# Patient Record
Sex: FEMALE | Race: WHITE | NOT HISPANIC OR LATINO | Employment: FULL TIME | URBAN - METROPOLITAN AREA
[De-identification: names, ages, dates, MRNs, and addresses within clinical notes are randomized per-mention and may not be internally consistent; named-entity substitution may affect disease eponyms.]

---

## 2018-01-26 LAB
EXTERNAL ABO GROUPING: NORMAL
EXTERNAL ANTIBODY SCREEN: NEGATIVE
EXTERNAL HEPATITIS B SURFACE ANTIGEN: NEGATIVE
EXTERNAL HEPATITIS C AB: NORMAL
EXTERNAL RH FACTOR: NEGATIVE
EXTERNAL RUBELLA QUALITATIVE: NORMAL
EXTERNAL SYPHILIS RPR SCREEN: NORMAL
HIV1 P24 AG SERPL QL IA: NORMAL

## 2018-05-08 ENCOUNTER — TRANSCRIBE ORDERS (OUTPATIENT)
Dept: ADMINISTRATIVE | Facility: HOSPITAL | Age: 38
End: 2018-05-08

## 2018-05-08 DIAGNOSIS — N85.6 UTERINE SYNECHIAE: Primary | ICD-10-CM

## 2018-06-11 ENCOUNTER — HOSPITAL ENCOUNTER (OUTPATIENT)
Dept: ULTRASOUND IMAGING | Facility: HOSPITAL | Age: 38
Discharge: HOME | End: 2018-06-11
Attending: OBSTETRICS & GYNECOLOGY | Admitting: OBSTETRICS & GYNECOLOGY

## 2018-06-11 ENCOUNTER — APPOINTMENT (OUTPATIENT)
Dept: ULTRASOUND IMAGING | Facility: HOSPITAL | Age: 38
End: 2018-06-11
Attending: OBSTETRICS & GYNECOLOGY

## 2018-06-11 DIAGNOSIS — N85.6 UTERINE SYNECHIAE: ICD-10-CM

## 2018-06-11 PROCEDURE — 76805 OB US >/= 14 WKS SNGL FETUS: CPT

## 2018-06-11 NOTE — CONSULTS
"[facility]  Maternal-Fetal Medicine Consult Note    Dear Dr. Cobos:     I saw the above named patient for consultation upon your request due to AMA, succenturiate lobe and possible synechiae.     I was able to view her prenatal record as a faxed copy from your office.     Normal fetal anatomy, posterior succenturiate lobe with vessels running through the \"shelf/fold\" in the chorion/amnion.     A follow up scan at 32-33 weeks is suggested to screen for SGA/IUGR and assess for appropriate interval fetal growth.     If you wish, the follow up imaging and testing can be performed in your office.  Alternatively, we would be happy to perform the evaluation in the [facility].  Please call to schedule if you would prefer.     Due to maternal age, because of the increased risk of unexplained, late pregnancy stillbirth, I recommend initiating weekly BPP by 34-35 weeks along with daily Fetal Movement Monitoring and delivery at 39-40 completed weeks, or earlier if medically or obstetrically indicated.  I recommend early 1 hr GCT along with a TSH, since about 1% of women >35 are hypothyroid.     Cystic Fibrosis and SMA carrier screening is advised by ACOG and ACMG, and I encourage screening through your office.  Alternatively, consider Expanded Carrier Screening.     PRIOR  - studies have not shown that ultrasound can reliably determine the integrity of a previous  section scar.     Printed information was provided to your patient regarding AMA.     ACOG along with the CDC recommend Tdap vaccine after 20 weeks gestation during each pregnancy and a flu vaccine during the flu season to provide passive immunity to the .     For billing purposes, 15 min spent face-to-face with the patient of which > 50% devoted to patient counseling and coordination of care, exclusive of ultrasound exam.     If you have any questions about the results of this sonogram or my recommendations, please feel free to contact me at any " time.     Thank you for referring this patient to the [facility] at [facility].  If you have any questions about the results of this examination or my recommendations, please feel free to contact me at your convenience.     Sincerely yours,    Wood Brunson MD  6/11/2018  1:46 PM

## 2018-08-20 LAB — EXTERNAL GROUP B STREP ANTIGEN: NORMAL

## 2018-09-05 ENCOUNTER — HOSPITAL ENCOUNTER (INPATIENT)
Facility: HOSPITAL | Age: 38
LOS: 3 days | Discharge: HOME | End: 2018-09-08
Attending: OBSTETRICS & GYNECOLOGY | Admitting: OBSTETRICS & GYNECOLOGY

## 2018-09-05 ENCOUNTER — ANESTHESIA (OUTPATIENT)
Dept: LABOR AND DELIVERY | Facility: HOSPITAL | Age: 38
End: 2018-09-05

## 2018-09-05 ENCOUNTER — ANESTHESIA EVENT (OUTPATIENT)
Dept: LABOR AND DELIVERY | Facility: HOSPITAL | Age: 38
End: 2018-09-05

## 2018-09-05 PROBLEM — Z34.90 PREGNANCY: Status: ACTIVE | Noted: 2018-09-05

## 2018-09-05 LAB
ABO GROUP BLD: NORMAL
ABO GROUP BLD: NORMAL
ATMOSPHERIC PRESS: 753.9 MMHG
BASE EXCESS BLDCOV CALC-SCNC: -3.2 MMOL/L (ref -30–30)
BASOPHILS # BLD AUTO: 0.01 10*3/MM3 (ref 0–0.2)
BASOPHILS NFR BLD AUTO: 0.2 % (ref 0–1.5)
BDY SITE: ABNORMAL
BLD GP AB SCN SERPL QL: POSITIVE
DEPRECATED RDW RBC AUTO: 39.1 FL (ref 37–54)
EOSINOPHIL # BLD AUTO: 0.1 10*3/MM3 (ref 0–0.7)
EOSINOPHIL NFR BLD AUTO: 1.5 % (ref 0.3–6.2)
ERYTHROCYTE [DISTWIDTH] IN BLOOD BY AUTOMATED COUNT: 12.2 % (ref 11.7–13)
FETAL BLEED: NEGATIVE
GAS FLOW AIRWAY: 2 LPM
HCO3 BLDCOV-SCNC: 22.7 MMOL/L
HCT VFR BLD AUTO: 32.1 % (ref 35.6–45.5)
HGB BLD-MCNC: 9.9 G/DL (ref 11.9–15.5)
IMM GRANULOCYTES # BLD: 0.02 10*3/MM3 (ref 0–0.03)
IMM GRANULOCYTES NFR BLD: 0.3 % (ref 0–0.5)
LYMPHOCYTES # BLD AUTO: 1.5 10*3/MM3 (ref 0.9–4.8)
LYMPHOCYTES NFR BLD AUTO: 22.5 % (ref 19.6–45.3)
MCH RBC QN AUTO: 26.9 PG (ref 26.9–32)
MCHC RBC AUTO-ENTMCNC: 30.8 G/DL (ref 32.4–36.3)
MCV RBC AUTO: 87.2 FL (ref 80.5–98.2)
MODALITY: ABNORMAL
MONOCYTES # BLD AUTO: 0.39 10*3/MM3 (ref 0.2–1.2)
MONOCYTES NFR BLD AUTO: 5.9 % (ref 5–12)
NEUTROPHILS # BLD AUTO: 4.64 10*3/MM3 (ref 1.9–8.1)
NEUTROPHILS NFR BLD AUTO: 69.6 % (ref 42.7–76)
NUMBER OF DOSES: NORMAL
PCO2 BLDCOV: 42.9 MM HG (ref 35–51.3)
PH BLDCOV: 7.33 PH UNITS (ref 7.26–7.4)
PLATELET # BLD AUTO: 135 10*3/MM3 (ref 140–500)
PMV BLD AUTO: 10 FL (ref 6–12)
PO2 BLDCOV: 25.5 MM HG (ref 19–39)
RBC # BLD AUTO: 3.68 10*6/MM3 (ref 3.9–5.2)
RESIDUAL RHIG DETECTED: NORMAL
RH BLD: NEGATIVE
RH BLD: NEGATIVE
SAO2 % BLDCOA: 42.1 % (ref 92–99)
SAO2 % BLDCOV: ABNORMAL %
T&S EXPIRATION DATE: NORMAL
WBC NRBC COR # BLD: 6.66 10*3/MM3 (ref 4.5–10.7)

## 2018-09-05 PROCEDURE — 25010000002 RHO D IMMUNE GLOBULIN 1500 UNIT/2ML SOLUTION PREFILLED SYRINGE: Performed by: OBSTETRICS & GYNECOLOGY

## 2018-09-05 PROCEDURE — 25010000003 CEFAZOLIN IN DEXTROSE 2-4 GM/100ML-% SOLUTION: Performed by: OBSTETRICS & GYNECOLOGY

## 2018-09-05 PROCEDURE — 86850 RBC ANTIBODY SCREEN: CPT | Performed by: OBSTETRICS & GYNECOLOGY

## 2018-09-05 PROCEDURE — 25010000002 ONDANSETRON PER 1 MG: Performed by: ANESTHESIOLOGY

## 2018-09-05 PROCEDURE — 25010000002 ONDANSETRON PER 1 MG: Performed by: OBSTETRICS & GYNECOLOGY

## 2018-09-05 PROCEDURE — 86901 BLOOD TYPING SEROLOGIC RH(D): CPT | Performed by: OBSTETRICS & GYNECOLOGY

## 2018-09-05 PROCEDURE — 88307 TISSUE EXAM BY PATHOLOGIST: CPT

## 2018-09-05 PROCEDURE — 25010000002 MORPHINE PER 10 MG: Performed by: NURSE ANESTHETIST, CERTIFIED REGISTERED

## 2018-09-05 PROCEDURE — 82803 BLOOD GASES ANY COMBINATION: CPT

## 2018-09-05 PROCEDURE — 85461 HEMOGLOBIN FETAL: CPT | Performed by: OBSTETRICS & GYNECOLOGY

## 2018-09-05 PROCEDURE — 85025 COMPLETE CBC W/AUTO DIFF WBC: CPT | Performed by: OBSTETRICS & GYNECOLOGY

## 2018-09-05 PROCEDURE — 86870 RBC ANTIBODY IDENTIFICATION: CPT | Performed by: OBSTETRICS & GYNECOLOGY

## 2018-09-05 PROCEDURE — 86900 BLOOD TYPING SEROLOGIC ABO: CPT | Performed by: OBSTETRICS & GYNECOLOGY

## 2018-09-05 RX ORDER — NALOXONE HCL 0.4 MG/ML
0.4 VIAL (ML) INJECTION
Status: DISCONTINUED | OUTPATIENT
Start: 2018-09-05 | End: 2018-09-08 | Stop reason: HOSPADM

## 2018-09-05 RX ORDER — LIDOCAINE HYDROCHLORIDE 10 MG/ML
5 INJECTION, SOLUTION EPIDURAL; INFILTRATION; INTRACAUDAL; PERINEURAL AS NEEDED
Status: DISCONTINUED | OUTPATIENT
Start: 2018-09-05 | End: 2018-09-05 | Stop reason: HOSPADM

## 2018-09-05 RX ORDER — FAMOTIDINE 10 MG/ML
20 INJECTION, SOLUTION INTRAVENOUS ONCE AS NEEDED
Status: COMPLETED | OUTPATIENT
Start: 2018-09-05 | End: 2018-09-05

## 2018-09-05 RX ORDER — HYDROXYZINE 50 MG/1
50 TABLET, FILM COATED ORAL EVERY 6 HOURS PRN
Status: DISCONTINUED | OUTPATIENT
Start: 2018-09-05 | End: 2018-09-08 | Stop reason: HOSPADM

## 2018-09-05 RX ORDER — DIPHENHYDRAMINE HYDROCHLORIDE 50 MG/ML
25 INJECTION INTRAMUSCULAR; INTRAVENOUS EVERY 4 HOURS PRN
Status: DISCONTINUED | OUTPATIENT
Start: 2018-09-05 | End: 2018-09-05

## 2018-09-05 RX ORDER — ACETAMINOPHEN 650 MG/1
650 SUPPOSITORY RECTAL EVERY 4 HOURS PRN
Status: DISCONTINUED | OUTPATIENT
Start: 2018-09-05 | End: 2018-09-05 | Stop reason: HOSPADM

## 2018-09-05 RX ORDER — ONDANSETRON 2 MG/ML
4 INJECTION INTRAMUSCULAR; INTRAVENOUS ONCE AS NEEDED
Status: DISCONTINUED | OUTPATIENT
Start: 2018-09-05 | End: 2018-09-05

## 2018-09-05 RX ORDER — MORPHINE SULFATE 2 MG/ML
2 INJECTION, SOLUTION INTRAMUSCULAR; INTRAVENOUS
Status: DISCONTINUED | OUTPATIENT
Start: 2018-09-05 | End: 2018-09-05

## 2018-09-05 RX ORDER — METHYLERGONOVINE MALEATE 0.2 MG/ML
200 INJECTION INTRAVENOUS AS NEEDED
Status: DISCONTINUED | OUTPATIENT
Start: 2018-09-05 | End: 2018-09-05 | Stop reason: HOSPADM

## 2018-09-05 RX ORDER — ACETAMINOPHEN 500 MG
1000 TABLET ORAL EVERY 6 HOURS PRN
COMMUNITY
End: 2018-09-08 | Stop reason: HOSPADM

## 2018-09-05 RX ORDER — ERYTHROMYCIN 5 MG/G
OINTMENT OPHTHALMIC
Status: DISPENSED
Start: 2018-09-05 | End: 2018-09-05

## 2018-09-05 RX ORDER — IBUPROFEN 600 MG/1
600 TABLET ORAL EVERY 6 HOURS PRN
Status: DISCONTINUED | OUTPATIENT
Start: 2018-09-05 | End: 2018-09-05 | Stop reason: HOSPADM

## 2018-09-05 RX ORDER — PROMETHAZINE HYDROCHLORIDE 25 MG/ML
12.5 INJECTION, SOLUTION INTRAMUSCULAR; INTRAVENOUS EVERY 6 HOURS PRN
Status: DISCONTINUED | OUTPATIENT
Start: 2018-09-05 | End: 2018-09-08 | Stop reason: HOSPADM

## 2018-09-05 RX ORDER — SIMETHICONE 80 MG
80 TABLET,CHEWABLE ORAL 4 TIMES DAILY PRN
Status: DISCONTINUED | OUTPATIENT
Start: 2018-09-05 | End: 2018-09-08 | Stop reason: HOSPADM

## 2018-09-05 RX ORDER — MORPHINE SULFATE 1 MG/ML
INJECTION, SOLUTION EPIDURAL; INTRATHECAL; INTRAVENOUS
Status: COMPLETED
Start: 2018-09-05 | End: 2018-09-05

## 2018-09-05 RX ORDER — ACETAMINOPHEN 500 MG
1000 TABLET ORAL EVERY 4 HOURS PRN
Status: DISCONTINUED | OUTPATIENT
Start: 2018-09-05 | End: 2018-09-05 | Stop reason: HOSPADM

## 2018-09-05 RX ORDER — ONDANSETRON 4 MG/1
4 TABLET, ORALLY DISINTEGRATING ORAL EVERY 6 HOURS PRN
Status: DISCONTINUED | OUTPATIENT
Start: 2018-09-05 | End: 2018-09-08 | Stop reason: HOSPADM

## 2018-09-05 RX ORDER — OXYCODONE HYDROCHLORIDE AND ACETAMINOPHEN 5; 325 MG/1; MG/1
2 TABLET ORAL EVERY 4 HOURS PRN
Status: DISCONTINUED | OUTPATIENT
Start: 2018-09-05 | End: 2018-09-08 | Stop reason: HOSPADM

## 2018-09-05 RX ORDER — BUPIVACAINE HYDROCHLORIDE 7.5 MG/ML
INJECTION, SOLUTION INTRASPINAL
Status: DISPENSED
Start: 2018-09-05 | End: 2018-09-05

## 2018-09-05 RX ORDER — ACETAMINOPHEN 500 MG
1000 TABLET ORAL ONCE
Status: COMPLETED | OUTPATIENT
Start: 2018-09-05 | End: 2018-09-05

## 2018-09-05 RX ORDER — OXYTOCIN-SODIUM CHLORIDE 0.9% IV SOLN 30 UNIT/500ML 30-0.9/5 UT/ML-%
125 SOLUTION INTRAVENOUS CONTINUOUS PRN
Status: DISCONTINUED | OUTPATIENT
Start: 2018-09-05 | End: 2018-09-05 | Stop reason: HOSPADM

## 2018-09-05 RX ORDER — DOCUSATE SODIUM 100 MG/1
100 CAPSULE, LIQUID FILLED ORAL 2 TIMES DAILY PRN
Status: DISCONTINUED | OUTPATIENT
Start: 2018-09-05 | End: 2018-09-08 | Stop reason: HOSPADM

## 2018-09-05 RX ORDER — NALBUPHINE HCL 10 MG/ML
10 AMPUL (ML) INJECTION
Status: DISCONTINUED | OUTPATIENT
Start: 2018-09-05 | End: 2018-09-05 | Stop reason: HOSPADM

## 2018-09-05 RX ORDER — PROMETHAZINE HYDROCHLORIDE 25 MG/1
12.5 SUPPOSITORY RECTAL EVERY 6 HOURS PRN
Status: DISCONTINUED | OUTPATIENT
Start: 2018-09-05 | End: 2018-09-08 | Stop reason: HOSPADM

## 2018-09-05 RX ORDER — CEFAZOLIN SODIUM 2 G/100ML
2 INJECTION, SOLUTION INTRAVENOUS ONCE
Status: COMPLETED | OUTPATIENT
Start: 2018-09-05 | End: 2018-09-05

## 2018-09-05 RX ORDER — PRENATAL VIT/IRON FUM/FOLIC AC 27MG-0.8MG
1 TABLET ORAL DAILY
COMMUNITY

## 2018-09-05 RX ORDER — DIPHENHYDRAMINE HCL 25 MG
25 CAPSULE ORAL EVERY 4 HOURS PRN
Status: DISCONTINUED | OUTPATIENT
Start: 2018-09-05 | End: 2018-09-05

## 2018-09-05 RX ORDER — ONDANSETRON 2 MG/ML
4 INJECTION INTRAMUSCULAR; INTRAVENOUS ONCE AS NEEDED
Status: COMPLETED | OUTPATIENT
Start: 2018-09-05 | End: 2018-09-05

## 2018-09-05 RX ORDER — MISOPROSTOL 200 UG/1
600 TABLET ORAL ONCE AS NEEDED
Status: DISCONTINUED | OUTPATIENT
Start: 2018-09-05 | End: 2018-09-08 | Stop reason: HOSPADM

## 2018-09-05 RX ORDER — MORPHINE SULFATE 2 MG/ML
2 INJECTION, SOLUTION INTRAMUSCULAR; INTRAVENOUS EVERY 4 HOURS PRN
Status: DISCONTINUED | OUTPATIENT
Start: 2018-09-05 | End: 2018-09-08 | Stop reason: HOSPADM

## 2018-09-05 RX ORDER — DIPHENHYDRAMINE HYDROCHLORIDE 50 MG/ML
25 INJECTION INTRAMUSCULAR; INTRAVENOUS NIGHTLY PRN
Status: DISCONTINUED | OUTPATIENT
Start: 2018-09-05 | End: 2018-09-05 | Stop reason: HOSPADM

## 2018-09-05 RX ORDER — BUPIVACAINE HYDROCHLORIDE 7.5 MG/ML
INJECTION, SOLUTION EPIDURAL; RETROBULBAR AS NEEDED
Status: DISCONTINUED | OUTPATIENT
Start: 2018-09-05 | End: 2018-09-05 | Stop reason: SURG

## 2018-09-05 RX ORDER — LANOLIN 100 %
OINTMENT (GRAM) TOPICAL
Status: DISCONTINUED | OUTPATIENT
Start: 2018-09-05 | End: 2018-09-08 | Stop reason: HOSPADM

## 2018-09-05 RX ORDER — OXYCODONE HYDROCHLORIDE AND ACETAMINOPHEN 5; 325 MG/1; MG/1
1 TABLET ORAL EVERY 4 HOURS PRN
Status: DISCONTINUED | OUTPATIENT
Start: 2018-09-05 | End: 2018-09-05 | Stop reason: HOSPADM

## 2018-09-05 RX ORDER — ONDANSETRON 4 MG/1
4 TABLET, ORALLY DISINTEGRATING ORAL EVERY 6 HOURS PRN
Status: DISCONTINUED | OUTPATIENT
Start: 2018-09-05 | End: 2018-09-05 | Stop reason: HOSPADM

## 2018-09-05 RX ORDER — EPHEDRINE SULFATE 50 MG/ML
INJECTION, SOLUTION INTRAVENOUS AS NEEDED
Status: DISCONTINUED | OUTPATIENT
Start: 2018-09-05 | End: 2018-09-05 | Stop reason: SURG

## 2018-09-05 RX ORDER — ACETAMINOPHEN 325 MG/1
650 TABLET ORAL EVERY 4 HOURS PRN
Status: DISCONTINUED | OUTPATIENT
Start: 2018-09-05 | End: 2018-09-08 | Stop reason: HOSPADM

## 2018-09-05 RX ORDER — LANOLIN ALCOHOL/MO/W.PET/CERES
400 CREAM (GRAM) TOPICAL DAILY
COMMUNITY
End: 2018-09-08 | Stop reason: HOSPADM

## 2018-09-05 RX ORDER — ONDANSETRON 2 MG/ML
4 INJECTION INTRAMUSCULAR; INTRAVENOUS EVERY 6 HOURS PRN
Status: DISCONTINUED | OUTPATIENT
Start: 2018-09-05 | End: 2018-09-08 | Stop reason: HOSPADM

## 2018-09-05 RX ORDER — SODIUM CHLORIDE 0.9 % (FLUSH) 0.9 %
1-10 SYRINGE (ML) INJECTION AS NEEDED
Status: DISCONTINUED | OUTPATIENT
Start: 2018-09-05 | End: 2018-09-05 | Stop reason: HOSPADM

## 2018-09-05 RX ORDER — DIPHENHYDRAMINE HCL 25 MG
25 CAPSULE ORAL EVERY 4 HOURS PRN
Status: DISCONTINUED | OUTPATIENT
Start: 2018-09-05 | End: 2018-09-08 | Stop reason: HOSPADM

## 2018-09-05 RX ORDER — BISACODYL 10 MG
10 SUPPOSITORY, RECTAL RECTAL DAILY PRN
Status: DISCONTINUED | OUTPATIENT
Start: 2018-09-05 | End: 2018-09-08 | Stop reason: HOSPADM

## 2018-09-05 RX ORDER — SODIUM CHLORIDE 0.9 % (FLUSH) 0.9 %
1-10 SYRINGE (ML) INJECTION AS NEEDED
Status: DISCONTINUED | OUTPATIENT
Start: 2018-09-05 | End: 2018-09-08 | Stop reason: HOSPADM

## 2018-09-05 RX ORDER — ONDANSETRON 4 MG/1
4 TABLET, FILM COATED ORAL EVERY 6 HOURS PRN
Status: DISCONTINUED | OUTPATIENT
Start: 2018-09-05 | End: 2018-09-05 | Stop reason: HOSPADM

## 2018-09-05 RX ORDER — IBUPROFEN 800 MG/1
800 TABLET ORAL EVERY 8 HOURS PRN
Status: DISCONTINUED | OUTPATIENT
Start: 2018-09-05 | End: 2018-09-08 | Stop reason: HOSPADM

## 2018-09-05 RX ORDER — ONDANSETRON 2 MG/ML
4 INJECTION INTRAMUSCULAR; INTRAVENOUS EVERY 6 HOURS PRN
Status: DISCONTINUED | OUTPATIENT
Start: 2018-09-05 | End: 2018-09-05 | Stop reason: HOSPADM

## 2018-09-05 RX ORDER — PROMETHAZINE HYDROCHLORIDE 25 MG/1
25 TABLET ORAL EVERY 6 HOURS PRN
Status: DISCONTINUED | OUTPATIENT
Start: 2018-09-05 | End: 2018-09-08 | Stop reason: HOSPADM

## 2018-09-05 RX ORDER — ONDANSETRON 4 MG/1
4 TABLET, FILM COATED ORAL EVERY 6 HOURS PRN
Status: DISCONTINUED | OUTPATIENT
Start: 2018-09-05 | End: 2018-09-08 | Stop reason: HOSPADM

## 2018-09-05 RX ORDER — PROMETHAZINE HYDROCHLORIDE 25 MG/ML
12.5 INJECTION, SOLUTION INTRAMUSCULAR; INTRAVENOUS EVERY 6 HOURS PRN
Status: DISCONTINUED | OUTPATIENT
Start: 2018-09-05 | End: 2018-09-05 | Stop reason: HOSPADM

## 2018-09-05 RX ORDER — CARBOPROST TROMETHAMINE 250 UG/ML
250 INJECTION, SOLUTION INTRAMUSCULAR AS NEEDED
Status: DISCONTINUED | OUTPATIENT
Start: 2018-09-05 | End: 2018-09-05 | Stop reason: HOSPADM

## 2018-09-05 RX ORDER — PHYTONADIONE 2 MG/ML
INJECTION, EMULSION INTRAMUSCULAR; INTRAVENOUS; SUBCUTANEOUS
Status: DISPENSED
Start: 2018-09-05 | End: 2018-09-05

## 2018-09-05 RX ORDER — NALOXONE HCL 0.4 MG/ML
0.2 VIAL (ML) INJECTION
Status: DISCONTINUED | OUTPATIENT
Start: 2018-09-05 | End: 2018-09-08 | Stop reason: HOSPADM

## 2018-09-05 RX ORDER — OXYTOCIN-SODIUM CHLORIDE 0.9% IV SOLN 30 UNIT/500ML 30-0.9/5 UT/ML-%
250 SOLUTION INTRAVENOUS CONTINUOUS
Status: DISCONTINUED | OUTPATIENT
Start: 2018-09-05 | End: 2018-09-05

## 2018-09-05 RX ORDER — PROMETHAZINE HYDROCHLORIDE 12.5 MG/1
12.5 SUPPOSITORY RECTAL EVERY 6 HOURS PRN
Status: DISCONTINUED | OUTPATIENT
Start: 2018-09-05 | End: 2018-09-05 | Stop reason: HOSPADM

## 2018-09-05 RX ORDER — MISOPROSTOL 200 UG/1
800 TABLET ORAL AS NEEDED
Status: DISCONTINUED | OUTPATIENT
Start: 2018-09-05 | End: 2018-09-05 | Stop reason: HOSPADM

## 2018-09-05 RX ORDER — FENTANYL CITRATE 50 UG/ML
INJECTION, SOLUTION INTRAMUSCULAR; INTRAVENOUS
Status: DISCONTINUED
Start: 2018-09-05 | End: 2018-09-05 | Stop reason: WASHOUT

## 2018-09-05 RX ORDER — ASPIRIN 81 MG/1
81 TABLET, CHEWABLE ORAL DAILY
COMMUNITY
End: 2018-09-08 | Stop reason: HOSPADM

## 2018-09-05 RX ORDER — MAGNESIUM HYDROXIDE/ALUMINUM HYDROXICE/SIMETHICONE 120; 1200; 1200 MG/30ML; MG/30ML; MG/30ML
10 SUSPENSION ORAL EVERY 6 HOURS PRN
COMMUNITY
End: 2018-09-08 | Stop reason: HOSPADM

## 2018-09-05 RX ORDER — OXYTOCIN-SODIUM CHLORIDE 0.9% IV SOLN 30 UNIT/500ML 30-0.9/5 UT/ML-%
999 SOLUTION INTRAVENOUS ONCE
Status: DISCONTINUED | OUTPATIENT
Start: 2018-09-05 | End: 2018-09-05 | Stop reason: HOSPADM

## 2018-09-05 RX ORDER — OXYCODONE HYDROCHLORIDE AND ACETAMINOPHEN 5; 325 MG/1; MG/1
1 TABLET ORAL EVERY 4 HOURS PRN
Status: DISCONTINUED | OUTPATIENT
Start: 2018-09-05 | End: 2018-09-08 | Stop reason: HOSPADM

## 2018-09-05 RX ORDER — SODIUM CHLORIDE, SODIUM LACTATE, POTASSIUM CHLORIDE, CALCIUM CHLORIDE 600; 310; 30; 20 MG/100ML; MG/100ML; MG/100ML; MG/100ML
125 INJECTION, SOLUTION INTRAVENOUS CONTINUOUS
Status: DISCONTINUED | OUTPATIENT
Start: 2018-09-05 | End: 2018-09-05

## 2018-09-05 RX ORDER — MORPHINE SULFATE 1 MG/ML
INJECTION, SOLUTION EPIDURAL; INTRATHECAL; INTRAVENOUS AS NEEDED
Status: DISCONTINUED | OUTPATIENT
Start: 2018-09-05 | End: 2018-09-05 | Stop reason: SURG

## 2018-09-05 RX ORDER — DIPHENHYDRAMINE HCL 25 MG
25 CAPSULE ORAL NIGHTLY PRN
Status: DISCONTINUED | OUTPATIENT
Start: 2018-09-05 | End: 2018-09-05 | Stop reason: HOSPADM

## 2018-09-05 RX ORDER — PROMETHAZINE HYDROCHLORIDE 25 MG/1
12.5 TABLET ORAL EVERY 6 HOURS PRN
Status: DISCONTINUED | OUTPATIENT
Start: 2018-09-05 | End: 2018-09-05 | Stop reason: HOSPADM

## 2018-09-05 RX ADMIN — ONDANSETRON 4 MG: 2 INJECTION INTRAMUSCULAR; INTRAVENOUS at 07:06

## 2018-09-05 RX ADMIN — BUPIVACAINE HYDROCHLORIDE 1.8 ML: 7.5 INJECTION, SOLUTION EPIDURAL; RETROBULBAR at 08:02

## 2018-09-05 RX ADMIN — CEFAZOLIN SODIUM 2 G: 2 INJECTION, SOLUTION INTRAVENOUS at 07:49

## 2018-09-05 RX ADMIN — OXYCODONE HYDROCHLORIDE AND ACETAMINOPHEN 1 TABLET: 5; 325 TABLET ORAL at 15:22

## 2018-09-05 RX ADMIN — MORPHINE SULFATE 0.2 MG: 1 INJECTION EPIDURAL; INTRATHECAL; INTRAVENOUS at 08:02

## 2018-09-05 RX ADMIN — EPHEDRINE SULFATE 10 MG: 50 INJECTION INTRAMUSCULAR; INTRAVENOUS; SUBCUTANEOUS at 08:03

## 2018-09-05 RX ADMIN — ACETAMINOPHEN 1000 MG: 500 TABLET, FILM COATED ORAL at 07:11

## 2018-09-05 RX ADMIN — OXYTOCIN 999 ML/HR: 10 INJECTION INTRAVENOUS at 08:27

## 2018-09-05 RX ADMIN — SODIUM CHLORIDE, POTASSIUM CHLORIDE, SODIUM LACTATE AND CALCIUM CHLORIDE: 600; 310; 30; 20 INJECTION, SOLUTION INTRAVENOUS at 07:51

## 2018-09-05 RX ADMIN — SODIUM CHLORIDE, POTASSIUM CHLORIDE, SODIUM LACTATE AND CALCIUM CHLORIDE 125 ML/HR: 600; 310; 30; 20 INJECTION, SOLUTION INTRAVENOUS at 20:05

## 2018-09-05 RX ADMIN — ONDANSETRON 4 MG: 2 INJECTION INTRAMUSCULAR; INTRAVENOUS at 15:22

## 2018-09-05 RX ADMIN — SODIUM CHLORIDE, POTASSIUM CHLORIDE, SODIUM LACTATE AND CALCIUM CHLORIDE 1000 ML: 600; 310; 30; 20 INJECTION, SOLUTION INTRAVENOUS at 06:12

## 2018-09-05 RX ADMIN — MORPHINE SULFATE 2 MG: 2 INJECTION, SOLUTION INTRAMUSCULAR; INTRAVENOUS at 10:31

## 2018-09-05 RX ADMIN — HUMAN RHO(D) IMMUNE GLOBULIN 1500 UNITS: 1500 SOLUTION INTRAMUSCULAR; INTRAVENOUS at 15:00

## 2018-09-05 RX ADMIN — FAMOTIDINE 20 MG: 10 INJECTION INTRAVENOUS at 07:07

## 2018-09-05 RX ADMIN — Medication: at 11:40

## 2018-09-05 RX ADMIN — HYDROXYZINE HYDROCHLORIDE 50 MG: 50 TABLET, FILM COATED ORAL at 21:14

## 2018-09-05 RX ADMIN — SODIUM CHLORIDE, POTASSIUM CHLORIDE, SODIUM LACTATE AND CALCIUM CHLORIDE: 600; 310; 30; 20 INJECTION, SOLUTION INTRAVENOUS at 09:24

## 2018-09-05 RX ADMIN — OXYCODONE HYDROCHLORIDE AND ACETAMINOPHEN 1 TABLET: 5; 325 TABLET ORAL at 10:17

## 2018-09-05 RX ADMIN — IBUPROFEN 800 MG: 800 TABLET ORAL at 20:05

## 2018-09-05 RX ADMIN — IBUPROFEN 800 MG: 800 TABLET ORAL at 11:40

## 2018-09-05 NOTE — ANESTHESIA PREPROCEDURE EVALUATION
Anesthesia Evaluation     Patient summary reviewed and Nursing notes reviewed   NPO Solid Status: > 8 hours  NPO Liquid Status: > 8 hours           Airway   Mallampati: II  TM distance: >3 FB  Neck ROM: full  no difficulty expected  Dental - normal exam     Pulmonary - normal exam   Cardiovascular - normal exam        Neuro/Psych  GI/Hepatic/Renal/Endo      Musculoskeletal     Abdominal  - normal exam   Substance History      OB/GYN    (+) Pregnant,         Other                        Anesthesia Plan    ASA 2     spinal     Anesthetic plan, all risks, benefits, and alternatives have been provided, discussed and informed consent has been obtained with: patient.

## 2018-09-05 NOTE — OP NOTE
[facility]   Section Operative Note    Patient Name: Tere Lugo  :  1980  MRN:  [id number]      Date of procedure:  2018        Pre-Operative Dx:   1.  IUP at 37w4d weeks   2. Prior C/S    3. Fetal Growth Restriction  4. Advanced Maternal Age     Postoperative Dx:  Same        Procedure: , Low Transverse repeat     Surgeon: Agnieszka Cobos MD      Assistant: Adalberto george MD     Anesthesia: Spinal    EBL: 800 mL     Specimens   Order Name Source Comment Collection Info Order Time   POSTPARTUM RHIG EVALUATION Arm, Right   2018  9:10 AM    Has the patient received Rh Immune Globulin previously?  Yes       Please indicate the date  2018               Findings:                           Amniotic Fluid clear  Placenta Intact, 3 VC  Uterus normal  Tubes and ovaries normal bilaterally    Anterior placenta/ relatively thin lower uterine segment of 1/2 cm placenta below          Infant:            Gender: Viable female  infant     Weight: 2480 g (5 lb 7.5 oz)     Apgars: 8   @ 1 minute /     9   @ 5 minutes                 Indication for C/Section:     Prior C/S           Venous ph sent and placenta to pathology        Procedure Details:  The patient was taken to the operating room where spinal anesthesia was found to be adequate. She was prepped and draped in the usual sterile manner in the dorsal supine position with leftward tilt. A Pfannenstiel incision was made and carried down to the fascia. The fascia was incised in the mid-line and extended laterally  with Viera scissors. The fascia was grasped and elevated and  from the underlying rectus muscles sharply. The peritoneum was identified and entered. Peritoneal incision was extended superiorly and inferiorly with good visualization of the bladder. The bladder was relatively high on lower uterine segment. The flap was created sharply. The bladder blade was reinserted. The  lower uterine segment was incised in a transverse  fashion and extended laterally with cephalo-caudad traction. Entered thru placenta and down to amniotic sac. AROM with allis clamp.    The head was elevated and delivered through the incision. Nuchal x1 reduced.  The remainder of the infant was delivered without difficulty. The umbilical cord was clamped and cut after delayed cord clamping and the infant was handed off the field. Cord ph and cord bloods were obtained. The placenta was somewhat adherent but  Removed and appeared normal. The uterine incision was inspected and found to be without extensions. Uterine incision was closed in 2 layers with O monocryl . Second layer closure was  imbricating the first incorporating bladder flap peritoneum. The pelvic side walls were irrigated and cleared of all clot and debris. Adnexal anatomy was normal. The uterine incision was inspected and noted to be hemostatic. Rectus muscles were reapproximated  with 0 vicryl incorporating peritoneum. Subfacial area irrigated and made hemostatic.   The fascia was closed with 0 PDS in running continuous fashion. The subcutaneous tissue was irrigated and made hemostatic with bovie and closed with 3-0 vicryl. The skin was closed in subcuticular fashion with 4-0 Monocryl.   Instrument, sponge, and needle counts were correct prior the abdominal closure and at the conclusion of the case. Mother  to recovery room in stable condition.              Complications:     None          Antibiotics: cefazolin (Ancef)                      Agnieszka Cobos MD  9/5/2018  9:27 AM

## 2018-09-05 NOTE — LACTATION NOTE
This note was copied from a baby's chart.  P2 37 week SGA baby, Mom is AMA C/S today and reports baby latched well after delivery. Mom can easily express large drops of colostrum into baby's mouth but baby is too sleepy and not latching at present. Place STS without any cueing. Baby's last bgm was 61 per RN.  Encouraged to try again in one hour. She breast fed her first baby for a short time saying he was difficult to latch. She has personal pump at home. Discussed feeding patterns and output expectancy.

## 2018-09-05 NOTE — ANESTHESIA POSTPROCEDURE EVALUATION
"Patient: Tere Lugo    Procedure Summary     Date:  18 Room / Location:   [facility] /  [facility]    Anesthesia Start:  752 Anesthesia Stop:  924    Procedure:   SECTION REPEAT (N/A Abdomen) Diagnosis:      Surgeon:  Agnieszka Cobos MD Provider:  Aman Melendez MD    Anesthesia Type:  spinal ASA Status:  2          Anesthesia Type: spinal  Last vitals  BP   92/56 (180)   Temp   36.2 °C (97.1 °F) (180)   Pulse   66 (18)   Resp   16 (18)     SpO2   100 % (18)     Post Anesthesia Care and Evaluation    Patient location during evaluation: bedside  Patient participation: complete - patient participated  Level of consciousness: awake and alert  Pain management: adequate  Airway patency: patent  Anesthetic complications: No anesthetic complications    Cardiovascular status: acceptable  Respiratory status: acceptable  Hydration status: acceptable    Comments: BP 92/56 (Patient Position: Lying)   Pulse 66   Temp 36.2 °C (97.1 °F) (Oral)   Resp 16   Ht 160 cm (63\")   Wt 60.9 kg (134 lb 3.2 oz)   SpO2 100%   Breastfeeding? Yes   BMI 23.77 kg/m²       "

## 2018-09-05 NOTE — ANESTHESIA PROCEDURE NOTES
Spinal Block    Patient location during procedure: OB  Start Time: 9/5/2018 8:01 AM  Stop Time: 9/5/2018 8:02 AM  Indication:procedure for pain  Performed By  Anesthesiologist: KEMI LEYVA  Preanesthetic Checklist  Completed: patient identified, site marked, surgical consent, pre-op evaluation, timeout performed, IV checked, risks and benefits discussed and monitors and equipment checked  Spinal Block Prep:  Patient Position:sitting  Sterile Tech:cap, gloves, mask and sterile barriers  Prep:Chloraprep  Patient Monitoring:blood pressure monitoring and continuous pulse oximetry  Spinal Block Procedure  Approach:midline  Guidance:landmark technique and palpation technique  Location:L4-L5  Needle Type:Sprotte  Needle Gauge:24  Placement of Spinal needle event:cerebrospinal fluid aspirated  Paresthesia: no  Fluid Appearance:clear  Post Assessment  Patient Tolerance:patient tolerated the procedure well with no apparent complications  Complications no

## 2018-09-05 NOTE — L&D DELIVERY NOTE
[facility]   Section Operative Note    Patient Name: Tere Lugo  :  1980  MRN:  [id number]      Date of procedure:  2018        Pre-Operative Dx:   1.  IUP at 37w4d weeks   2. Prior C/S    3. Fetal Growth Restriction  4. Advanced Maternal Age     Postoperative Dx:  Same        Procedure: , Low Transverse repeat     Surgeon: Agnieszka Cobos MD      Assistant: Adalberto george MD     Anesthesia: Spinal    EBL: 800 mL     Specimens   Order Name Source Comment Collection Info Order Time   POSTPARTUM RHIG EVALUATION Arm, Right   2018  9:10 AM    Has the patient received Rh Immune Globulin previously?  Yes       Please indicate the date  2018               Findings:                           Amniotic Fluid clear  Placenta Intact, 3 VC  Uterus normal  Tubes and ovaries normal bilaterally    Anterior placenta/ relatively thin lower uterine segment of 1/2 cm placenta below          Infant:            Gender: Viable female  infant     Weight: 2480 g (5 lb 7.5 oz)     Apgars: 8   @ 1 minute /     9   @ 5 minutes                 Indication for C/Section:     Prior C/S           Venous ph sent and placenta to pathology        Procedure Details:  The patient was taken to the operating room where spinal anesthesia was found to be adequate. She was prepped and draped in the usual sterile manner in the dorsal supine position with leftward tilt. A Pfannenstiel incision was made and carried down to the fascia. The fascia was incised in the mid-line and extended laterally  with Viera scissors. The fascia was grasped and elevated and  from the underlying rectus muscles sharply. The peritoneum was identified and entered. Peritoneal incision was extended superiorly and inferiorly with good visualization of the bladder. The bladder was relatively high on lower uterine segment. The flap was created sharply. The bladder blade was reinserted. The  lower uterine segment was incised in a transverse  fashion and extended laterally with cephalo-caudad traction. Entered thru placenta and down to amniotic sac. AROM with allis clamp.    The head was elevated and delivered through the incision. Nuchal x1 reduced.  The remainder of the infant was delivered without difficulty. The umbilical cord was clamped and cut after delayed cord clamping and the infant was handed off the field. Cord ph and cord bloods were obtained. The placenta was somewhat adherent but  Removed and appeared normal. The uterine incision was inspected and found to be without extensions. Uterine incision was closed in 2 layers with O monocryl . Second layer closure was  imbricating the first incorporating bladder flap peritoneum. The pelvic side walls were irrigated and cleared of all clot and debris. Adnexal anatomy was normal. The uterine incision was inspected and noted to be hemostatic. Rectus muscles were reapproximated  with 0 vicryl incorporating peritoneum. Subfacial area irrigated and made hemostatic.   The fascia was closed with 0 PDS in running continuous fashion. The subcutaneous tissue was irrigated and made hemostatic with bovie and closed with 3-0 vicryl. The skin was closed in subcuticular fashion with 4-0 Monocryl.   Instrument, sponge, and needle counts were correct prior the abdominal closure and at the conclusion of the case. Mother  to recovery room in stable condition.              Complications:     None          Antibiotics: cefazolin (Ancef)                      Agnieszka Cobos MD  9/5/2018  9:27 AM

## 2018-09-05 NOTE — PLAN OF CARE
Problem: Patient Care Overview  Goal: Plan of Care Review  Outcome: Ongoing (interventions implemented as appropriate)   18   Coping/Psychosocial   Plan of Care Reviewed With patient   Plan of Care Review   Progress improving     Goal: Individualization and Mutuality  Outcome: Ongoing (interventions implemented as appropriate)   18   Individualization   Patient Specific Preferences evaristo care, breast feeding   Patient Specific Goals (Include Timeframe) pain control, early ambulation     Goal: Discharge Needs Assessment  Outcome: Ongoing (interventions implemented as appropriate)   18 0618 [id number]   Discharge Needs Assessment   Readmission Within the Last 30 Days --  --  no previous admission in last 30 days   Concerns to be Addressed --  --  denies needs/concerns at this time   Patient/Family Anticipates Transition to home;home with family --  --    Patient/Family Anticipated Services at Transition none --  --    Transportation Anticipated car, drives self;family or friend will provide --  --    Disability   Equipment Currently Used at Home --  none --      Goal: Interprofessional Rounds/Family Conf  Outcome: Ongoing (interventions implemented as appropriate)   18   Interdisciplinary Rounds/Family Conf   Participants family;patient;physician;nursing       Problem:  Delivery (Adult,Obstetrics,Pediatric)  Goal: Signs and Symptoms of Listed Potential Problems Will be Absent, Minimized or Managed ( Delivery)  Outcome: Outcome(s) achieved Date Met: 18   Goal/Outcome Evaluation   Problems Assessed ( Delivery) all   Problems Present ( Delivery) none       Problem: Fall Risk,  (Adult,Obstetrics,Pediatric)  Goal: Identify Related Risk Factors and Signs and Symptoms  Outcome: Ongoing (interventions implemented as appropriate)   18   Fall Risk,  (Adult,Obstetrics,Pediatric)   Related Risk  Factors (Fall Risk, ) regional anesthesia

## 2018-09-06 PROBLEM — D64.9 ANEMIA: Status: ACTIVE | Noted: 2018-09-06

## 2018-09-06 PROBLEM — Z34.90 PREGNANCY: Status: RESOLVED | Noted: 2018-09-05 | Resolved: 2018-09-06

## 2018-09-06 LAB
BASOPHILS # BLD AUTO: 0.01 10*3/MM3 (ref 0–0.2)
BASOPHILS NFR BLD AUTO: 0.1 % (ref 0–1.5)
DEPRECATED RDW RBC AUTO: 39.7 FL (ref 37–54)
EOSINOPHIL # BLD AUTO: 0.08 10*3/MM3 (ref 0–0.7)
EOSINOPHIL NFR BLD AUTO: 1.1 % (ref 0.3–6.2)
ERYTHROCYTE [DISTWIDTH] IN BLOOD BY AUTOMATED COUNT: 12.3 % (ref 11.7–13)
HCT VFR BLD AUTO: 26.2 % (ref 35.6–45.5)
HGB BLD-MCNC: 8 G/DL (ref 11.9–15.5)
IMM GRANULOCYTES # BLD: 0.02 10*3/MM3 (ref 0–0.03)
IMM GRANULOCYTES NFR BLD: 0.3 % (ref 0–0.5)
LYMPHOCYTES # BLD AUTO: 1.28 10*3/MM3 (ref 0.9–4.8)
LYMPHOCYTES NFR BLD AUTO: 17 % (ref 19.6–45.3)
MCH RBC QN AUTO: 26.8 PG (ref 26.9–32)
MCHC RBC AUTO-ENTMCNC: 30.5 G/DL (ref 32.4–36.3)
MCV RBC AUTO: 87.6 FL (ref 80.5–98.2)
MONOCYTES # BLD AUTO: 0.39 10*3/MM3 (ref 0.2–1.2)
MONOCYTES NFR BLD AUTO: 5.2 % (ref 5–12)
NEUTROPHILS # BLD AUTO: 5.73 10*3/MM3 (ref 1.9–8.1)
NEUTROPHILS NFR BLD AUTO: 76.3 % (ref 42.7–76)
PLATELET # BLD AUTO: 119 10*3/MM3 (ref 140–500)
PMV BLD AUTO: 10.1 FL (ref 6–12)
RBC # BLD AUTO: 2.99 10*6/MM3 (ref 3.9–5.2)
WBC NRBC COR # BLD: 7.51 10*3/MM3 (ref 4.5–10.7)

## 2018-09-06 PROCEDURE — 85025 COMPLETE CBC W/AUTO DIFF WBC: CPT | Performed by: OBSTETRICS & GYNECOLOGY

## 2018-09-06 RX ADMIN — SIMETHICONE CHEW TAB 80 MG 80 MG: 80 TABLET ORAL at 11:06

## 2018-09-06 RX ADMIN — OXYCODONE HYDROCHLORIDE AND ACETAMINOPHEN 1 TABLET: 5; 325 TABLET ORAL at 11:05

## 2018-09-06 RX ADMIN — IBUPROFEN 800 MG: 800 TABLET ORAL at 06:50

## 2018-09-06 RX ADMIN — OXYCODONE HYDROCHLORIDE AND ACETAMINOPHEN 1 TABLET: 5; 325 TABLET ORAL at 15:13

## 2018-09-06 RX ADMIN — DOCUSATE SODIUM 100 MG: 100 CAPSULE, LIQUID FILLED ORAL at 11:05

## 2018-09-06 RX ADMIN — IBUPROFEN 800 MG: 800 TABLET ORAL at 23:58

## 2018-09-06 RX ADMIN — IBUPROFEN 800 MG: 800 TABLET ORAL at 15:13

## 2018-09-06 RX ADMIN — OXYCODONE HYDROCHLORIDE AND ACETAMINOPHEN 1 TABLET: 5; 325 TABLET ORAL at 20:42

## 2018-09-06 RX ADMIN — OXYCODONE HYDROCHLORIDE AND ACETAMINOPHEN 1 TABLET: 5; 325 TABLET ORAL at 06:50

## 2018-09-06 NOTE — PROGRESS NOTES
[facility]   PROGRESS NOTE    Patient Name: Tere Lugo  :  1980  MRN:  [id number]      Post-Op Day 1 S/P    Delivered a female infant.  Subjective     Patient reports:    Pain is well controlled. Voiding and ambulating without difficulty.    Tolerating po. Lochia normal.       The patient plans to breastfeed.         Objective       Vitals: Vital Signs Range for the last 24 hours  Temperature: Temp:  [96.6 °F (35.9 °C)-98.7 °F (37.1 °C)] 98.7 °F (37.1 °C)   Temp Source: Temp src: Oral   BP: BP: ()/(47-60) 89/53   Pulse: Heart Rate:  [59-89] 80   Respirations: Resp:  [16-18] 18         Intake/Output Summary (Last 24 hours) at 18 0849  Last data filed at 18 0415   Gross per 24 hour   Intake             3500 ml   Output             4205 ml   Net             -705 ml                                              Physical Exam     General Alert and awake, in NAD      CV Regular rate and rhythm      Lungs clear to auscultation bilaterally        Abdomen Soft, non-distended, fundus firm,  1 below umbilicus, appropriately tender      Incision  Dressing clean, dry and intact.      Extremities  tr edema, calves NT               LABS: Lab Results   Component Value Date    WBC 7.51 2018    HGB 8.0 (L) 2018    HCT 26.2 (L) 2018    MCV 87.6 2018     (L) 2018     Prenatal labs results reviewed:  Yes   Rubella:  Immune  Rh Status:  RH type   Date Value Ref Range Status   2018 Negative  Final                       Assessment/Plan   : 1. POD 1 S/P C/S: Hemodynamically stable.  Doing well.  Continue routine care.     Anemia- not lightheaded or dizzy-- d/w cont fe on d/c    Active Problems:    Anemia          Angie Rojas, APRN  2018  8:49 AM

## 2018-09-06 NOTE — LACTATION NOTE
Mom reports she BF first baby for 3 weeks and then baby preferred bottle so she pumped until 7th month. She reports this baby is latching well but will call with feeding to make sure baby nursing well.  Lactation Consult Note    Evaluation Completed: 2018 4:15 PM  Patient Name: Tere Lugo  :  1980  MRN:  [id number]     REFERRAL  INFORMATION:                                         DELIVERY HISTORY:          Skin to skin initiation date/time: 2018  9:31 AM   Skin to skin end date/time:              MATERNAL ASSESSMENT:                               INFANT ASSESSMENT:  Information for the patient's :  Parish Farzaneh [[id number]]   No past medical history on file.                                                                                                                                MATERNAL INFANT FEEDING:                                                                       EQUIPMENT TYPE:                                 BREAST PUMPING:          LACTATION REFERRALS:

## 2018-09-06 NOTE — PLAN OF CARE
Problem: Patient Care Overview  Goal: Plan of Care Review  Outcome: Ongoing (interventions implemented as appropriate)   18   Coping/Psychosocial   Plan of Care Reviewed With patient   Plan of Care Review   Progress improving   OTHER   Outcome Summary Pain well controlled, tolerating po well, bst feeding well, monitoring BP as it is running low-IV fluids running and pt states a slightly low BP is WNL for her-no dizziness/weakness       Problem: Postpartum ( Delivery) (Adult,Obstetrics,Pediatric)  Goal: Signs and Symptoms of Listed Potential Problems Will be Absent, Minimized or Managed (Postpartum)  Outcome: Ongoing (interventions implemented as appropriate)   18   Goal/Outcome Evaluation   Problems Assessed (Postpartum ) all   Problems Present (Postpartum ) none

## 2018-09-07 RX ADMIN — DOCUSATE SODIUM 100 MG: 100 CAPSULE, LIQUID FILLED ORAL at 10:05

## 2018-09-07 RX ADMIN — OXYCODONE HYDROCHLORIDE AND ACETAMINOPHEN 1 TABLET: 5; 325 TABLET ORAL at 19:04

## 2018-09-07 RX ADMIN — IBUPROFEN 800 MG: 800 TABLET ORAL at 19:04

## 2018-09-07 RX ADMIN — SIMETHICONE CHEW TAB 80 MG 80 MG: 80 TABLET ORAL at 10:05

## 2018-09-07 RX ADMIN — OXYCODONE HYDROCHLORIDE AND ACETAMINOPHEN 1 TABLET: 5; 325 TABLET ORAL at 07:03

## 2018-09-07 RX ADMIN — DOCUSATE SODIUM 100 MG: 100 CAPSULE, LIQUID FILLED ORAL at 19:52

## 2018-09-07 RX ADMIN — IBUPROFEN 800 MG: 800 TABLET ORAL at 10:50

## 2018-09-07 RX ADMIN — OXYCODONE HYDROCHLORIDE AND ACETAMINOPHEN 1 TABLET: 5; 325 TABLET ORAL at 14:27

## 2018-09-07 RX ADMIN — OXYCODONE HYDROCHLORIDE AND ACETAMINOPHEN 1 TABLET: 5; 325 TABLET ORAL at 10:05

## 2018-09-07 NOTE — PLAN OF CARE
Problem: Patient Care Overview  Goal: Plan of Care Review  Outcome: Ongoing (interventions implemented as appropriate)   18   Coping/Psychosocial   Plan of Care Reviewed With patient   OTHER   Outcome Summary Doing well. VS stable. Assessment WDL. Breastfeeding well.      Goal: Discharge Needs Assessment  Outcome: Ongoing (interventions implemented as appropriate)   1852 18   Discharge Needs Assessment   Readmission Within the Last 30 Days --  --  no previous admission in last 30 days   Concerns to be Addressed --  denies needs/concerns at this time --    Patient/Family Anticipates Transition to home;home with family --  --    Patient/Family Anticipated Services at Transition none --  --    Transportation Concerns --  --  car, none   Transportation Anticipated --  --  car, drives self;family or friend will provide   Disability   Equipment Currently Used at Home --  --  none     Goal: Interprofessional Rounds/Family Conf  Outcome: Ongoing (interventions implemented as appropriate)   18   Interdisciplinary Rounds/Family Conf   Participants family;nursing;patient       Problem: Postpartum ( Delivery) (Adult,Obstetrics,Pediatric)  Goal: Signs and Symptoms of Listed Potential Problems Will be Absent, Minimized or Managed (Postpartum)  Outcome: Ongoing (interventions implemented as appropriate)   18   Goal/Outcome Evaluation   Problems Assessed (Postpartum ) all   Problems Present (Postpartum ) none

## 2018-09-07 NOTE — LACTATION NOTE
P2, Baby girl is 37 weeks and nurses with enthusiasm when awake but often sleeps through feeding time. Mom has painful nipples with latch and reports misshapen nipples on release . Feels baby has a very small mouth . Discussed suck training prior to latching and nipple care. Mom will call for help next feeding.

## 2018-09-07 NOTE — PROGRESS NOTES
[facility]   PROGRESS NOTE    Patient Name: Tere Lugo  :  1980  MRN:  [id number]      Post-Op Day 2 S/P    Delivered a female infant.  Subjective     Patient reports:    Pain is well controlled. Voiding and ambulating without difficulty. Tolerating po. Lochia normal.       The patient plans to breastfeed.         Objective       Vitals: Vital Signs Range for the last 24 hours  Temperature: Temp:  [98 °F (36.7 °C)-98.9 °F (37.2 °C)] 98 °F (36.7 °C)   Temp Source: Temp src: Oral   BP: BP: (95-99)/(55-62) 95/58   Pulse: Heart Rate:  [78-88] 78   Respirations: Resp:  [18] 18         Intake/Output Summary (Last 24 hours) at 18 1100  Last data filed at 18 0500   Gross per 24 hour   Intake              960 ml   Output             3050 ml   Net            -2090 ml                                              Physical Exam     General Alert and awake, in NAD      CV Regular rate and rhythm      Lungs clear to auscultation bilaterally        Abdomen Soft, non-distended, fundus firm,  2 below umbilicus, appropriately tender      Incision  Dressing clean, dry and intact.      Extremities  tr edema, calves NT               LABS: Lab Results   Component Value Date    WBC 7.51 2018    HGB 8.0 (L) 2018    HCT 26.2 (L) 2018    MCV 87.6 2018     (L) 2018     Prenatal labs results reviewed:  Yes     Rh Status:    RH type   Date Value Ref Range Status   2018 Negative  Final                       Assessment/Plan   : 1. POD 1 S/P C/S: Hemodynamically stable.  Doing well.  Continue routine care. Hopes to go home tomorrow.    Active Problems:    Anemia          Kely Reddy MD  2018  11:00 AM

## 2018-09-08 VITALS
HEIGHT: 63 IN | TEMPERATURE: 98.4 F | BODY MASS INDEX: 23.78 KG/M2 | WEIGHT: 134.2 LBS | OXYGEN SATURATION: 100 % | HEART RATE: 67 BPM | DIASTOLIC BLOOD PRESSURE: 64 MMHG | RESPIRATION RATE: 18 BRPM | SYSTOLIC BLOOD PRESSURE: 96 MMHG

## 2018-09-08 RX ORDER — IBUPROFEN 800 MG/1
800 TABLET ORAL EVERY 8 HOURS PRN
Qty: 30 TABLET | Refills: 3 | Status: SHIPPED | OUTPATIENT
Start: 2018-09-08 | End: 2018-10-08

## 2018-09-08 RX ORDER — OXYCODONE HYDROCHLORIDE AND ACETAMINOPHEN 5; 325 MG/1; MG/1
2 TABLET ORAL EVERY 4 HOURS PRN
Qty: 24 TABLET | Refills: 0 | Status: SHIPPED | OUTPATIENT
Start: 2018-09-08 | End: 2018-09-11

## 2018-09-08 RX ADMIN — OXYCODONE HYDROCHLORIDE AND ACETAMINOPHEN 1 TABLET: 5; 325 TABLET ORAL at 08:17

## 2018-09-08 RX ADMIN — DOCUSATE SODIUM 100 MG: 100 CAPSULE, LIQUID FILLED ORAL at 08:18

## 2018-09-08 RX ADMIN — IBUPROFEN 800 MG: 800 TABLET ORAL at 03:19

## 2018-09-08 RX ADMIN — OXYCODONE HYDROCHLORIDE AND ACETAMINOPHEN 1 TABLET: 5; 325 TABLET ORAL at 00:02

## 2018-09-08 NOTE — PLAN OF CARE
Problem: Patient Care Overview  Goal: Plan of Care Review  Outcome: Outcome(s) achieved Date Met: 18 1054   Coping/Psychosocial   Plan of Care Reviewed With patient   Plan of Care Review   Progress improving   OTHER   Outcome Summary doing well. vs. pain well controlled with po meds. breastfeeding. home today.      Goal: Individualization and Mutuality  Outcome: Outcome(s) achieved Date Met: 18    Goal: Discharge Needs Assessment  Outcome: Outcome(s) achieved Date Met: 18 105   Discharge Needs Assessment   Readmission Within the Last 30 Days no previous admission in last 30 days   Concerns to be Addressed no discharge needs identified   Patient/Family Anticipates Transition to home   Patient/Family Anticipated Services at Transition none   Transportation Concerns car, none   Transportation Anticipated family or friend will provide   Anticipated Changes Related to Illness none   Equipment Needed After Discharge none   Disability   Equipment Currently Used at Home none     Goal: Interprofessional Rounds/Family Conf  Outcome: Outcome(s) achieved Date Met: 18 1054   Interdisciplinary Rounds/Family Conf   Participants nursing;advanced practice nurse       Problem: Fall Risk,  (Adult,Obstetrics,Pediatric)  Goal: Identify Related Risk Factors and Signs and Symptoms  Outcome: Outcome(s) achieved Date Met: 18 1054   Fall Risk,  (Adult,Obstetrics,Pediatric)   Related Risk Factors (Fall Risk, ) fatigue     Goal: Absence of Maternal Fall  Outcome: Outcome(s) achieved Date Met: 18 1054   Fall Risk,  (Adult,Obstetrics,Pediatric)   Absence of Maternal Fall achieves outcome     Goal: Absence of Barnum Fall/Drop  Outcome: Outcome(s) achieved Date Met: 18 1054   Fall Risk,  (Adult,Obstetrics,Pediatric)   Absence of  Fall/Drop achieves outcome       Problem: Postpartum  ( Delivery) (Adult,Obstetrics,Pediatric)  Goal: Signs and Symptoms of Listed Potential Problems Will be Absent, Minimized or Managed (Postpartum)  Outcome: Outcome(s) achieved Date Met: 18 1054   Goal/Outcome Evaluation   Problems Assessed (Postpartum ) all   Problems Present (Postpartum ) none

## 2018-09-08 NOTE — LACTATION NOTE
This note was copied from a baby's chart.  Milk is in and breasts are feeling softer after a feeding per Mom. She c/o painful latch. Assisted with deep latch that she reports feeling some better. Encouraged STS to help awaken her so she will open mouth wide and bring her to breast instead of breast to baby. APNO ordered per RN. Discussed feeding patterns, output expectancy, engorgement management and OPLC.

## 2018-09-08 NOTE — DISCHARGE SUMMARY
Date of Discharge:  2018    Discharge Diagnosis: Pregnancy, delivered    Presenting Problem/History of Present Illness  Pregnancy [Z34.90]       Hospital Course  Patient is a 38 y.o. female presented for scheduled C/S d/t history of C/S, FGR and AMA.  Delivered viable female infant. Hospital course uncomplicated, with exception of anemia. Pt is stable and ready for d/c.      Procedures Performed  Procedure(s):   SECTION REPEAT         Condition on Discharge:  Post-Op Day 3 S/P   Subjective     Patient reports:    Doing well - ready for discharge. Pain well controlled. Tolerating po and having flatus. Voiding and ambulating without difficulty. Lochia normal.     Vital Signs  Temp:  [97.7 °F (36.5 °C)-98.4 °F (36.9 °C)] 98.4 °F (36.9 °C)  Heart Rate:  [67-76] 67  Resp:  [18-20] 18  BP: ()/(57-64) 96/64    Physical Exam    Gen Alert and awake   Abdomen Soft, ND,  Fundus firm with minimal tenderness   Incision  Intact, without erythema or exudate   Extremities Calves NT bilaterally     Assessment/Plan ]   Assessment:  POD 3  -  Doing well. Stable for discharge. Home on oral iron.    Plan:    Instructions reviewed       Consults:   Consults     No orders found from 2018 to 2018.          Discharge Disposition  Home or Self Care    Discharge Medications     Discharge Medications      New Medications      Instructions Start Date   ibuprofen 800 MG tablet  Commonly known as:  ADVIL,MOTRIN   800 mg, Oral, Every 8 Hours PRN      oxyCODONE-acetaminophen 5-325 MG per tablet  Commonly known as:  PERCOCET   2 tablets, Oral, Every 4 Hours PRN         Continue These Medications      Instructions Start Date   prenatal vitamin 27-0.8 27-0.8 MG tablet tablet   1 tablet, Oral, Daily         Stop These Medications    acetaminophen 500 MG tablet  Commonly known as:  TYLENOL     aluminum-magnesium hydroxide-simethicone 200-200-20 MG/5ML suspension  Commonly known as:  MAALOX/MYLANTA     aspirin 81 MG  chewable tablet     folic acid 400 MCG tablet  Commonly known as:  FOLVITE            The patient has been prescribed a controlled substance.  She has been counseled on the risks associated with using the medication.   The addictive potential of this medication and alternatives were discussed carefully with this patient and she demonstrated understanding.  A DARIA report has been obtained and reviewed.    Activity at Discharge:     Follow-up Appointments  No future appointments.  Additional Instructions for the Follow-ups that You Need to Schedule     Call MD With Problems / Concerns    As directed      Instructions: call for fever, increased vaginal bleeding or pain, any other concerns    Order Comments:  Instructions: call for fever, increased vaginal bleeding or pain, any other concerns                Test Results Pending at Discharge       JAY JAY Valderrama  09/08/18  8:57 AM

## 2018-09-17 ENCOUNTER — TELEPHONE (OUTPATIENT)
Dept: LACTATION | Facility: HOSPITAL | Age: 38
End: 2018-09-17

## 2018-10-30 ENCOUNTER — HOSPITAL ENCOUNTER (EMERGENCY)
Facility: HOSPITAL | Age: 38
Discharge: HOME | End: 2018-10-30

## 2018-10-30 VITALS
RESPIRATION RATE: 18 BRPM | TEMPERATURE: 97.3 F | DIASTOLIC BLOOD PRESSURE: 72 MMHG | HEART RATE: 76 BPM | OXYGEN SATURATION: 100 % | SYSTOLIC BLOOD PRESSURE: 112 MMHG | HEIGHT: 63 IN

## 2018-10-30 PROCEDURE — 99211 OFF/OP EST MAY X REQ PHY/QHP: CPT

## 2018-10-30 PROCEDURE — 93010 ELECTROCARDIOGRAM REPORT: CPT | Performed by: INTERNAL MEDICINE

## 2018-10-30 PROCEDURE — 93005 ELECTROCARDIOGRAM TRACING: CPT

## 2018-12-10 ENCOUNTER — TRANSCRIBE ORDERS (OUTPATIENT)
Dept: ADMINISTRATIVE | Facility: HOSPITAL | Age: 38
End: 2018-12-10

## 2018-12-10 DIAGNOSIS — R10.11 RUQ PAIN: Primary | ICD-10-CM

## 2018-12-17 ENCOUNTER — HOSPITAL ENCOUNTER (OUTPATIENT)
Dept: ULTRASOUND IMAGING | Facility: HOSPITAL | Age: 38
Discharge: HOME | End: 2018-12-17
Attending: INTERNAL MEDICINE | Admitting: INTERNAL MEDICINE

## 2018-12-17 DIAGNOSIS — R10.11 RUQ PAIN: ICD-10-CM

## 2018-12-17 PROCEDURE — 76705 ECHO EXAM OF ABDOMEN: CPT

## 2019-01-14 ENCOUNTER — OFFICE VISIT (OUTPATIENT)
Dept: SURGERY | Facility: CLINIC | Age: 39
End: 2019-01-14

## 2019-01-14 VITALS — HEART RATE: 79 BPM | OXYGEN SATURATION: 98 % | HEIGHT: 63 IN | BODY MASS INDEX: 23.77 KG/M2

## 2019-01-14 DIAGNOSIS — K80.20 CALCULUS OF GALLBLADDER WITHOUT CHOLECYSTITIS WITHOUT OBSTRUCTION: Primary | ICD-10-CM

## 2019-01-14 PROCEDURE — 99203 OFFICE O/P NEW LOW 30 MIN: CPT | Performed by: SURGERY

## 2019-01-14 RX ORDER — CEFAZOLIN SODIUM 2 G/100ML
2 INJECTION, SOLUTION INTRAVENOUS ONCE
Status: CANCELLED | OUTPATIENT
Start: 2019-02-08 | End: 2019-01-14

## 2019-01-14 RX ORDER — FERROUS SULFATE TAB EC 324 MG (65 MG FE EQUIVALENT) 324 (65 FE) MG
324 TABLET DELAYED RESPONSE ORAL
COMMUNITY
End: 2019-02-01

## 2019-01-14 NOTE — PROGRESS NOTES
Cc: Abdominal pain    History of presenting illness:   This is a very nice and healthy 38-year-old lady with a couple of episodes of fairly severe right upper quadrant abdominal pain with radiation through into her back and up into her chest associated with some nausea and shortness of breath.  These have grown more persistent since her delivery of her last child a couple of months ago.    Past Medical History: Negative    Past Surgical History:  section ×2, dilatation and curettage for miscarriage, wisdom tooth surgery    Medications: Iron, prenatal vitamins    Allergies: None known    Social History: Patient is employed as an orthodontist.  She is a nonsmoker.  Uses alcohol rarely.    Family History: Positive for melanoma in her grandmother.    Review of Systems:  Constitutional: Negative for fevers, chills, change in weight or appetite  Neck: no swollen glands or dysphagia or odynophagia  Respiratory: negative for SOB, cough, hemoptysis or wheezing  Cardiovascular: negative for chest pain, palpitations or peripheral edema  Gastrointestinal: Positive for abdominal pain, abdominal distention, nausea      Physical Exam:   body mass index 23.8 General: alert and oriented, appropriate, no acute distress  Neck: Supple without lymphadenopathy or thyromegaly, trachea is in the midline  Respiratory: Lungs are clear bilaterally without wheezing, no use of accessory muscles is noted  Cardiovascular: Regular rate and rhythm without murmur, no peripheral edema  Gastrointestinal: Soft, mild tenderness only in the right upper quadrant without guarding or rebound.  No hernia.  No mass.    Laboratory data: None    Imaging data: Ultrasound shows polyp versus non-shadowing stones in the gallbladder.  Common bile duct measured about 2 mm.  Sludge is also noted in the gallbladder.      Assessment and plan:   -Cholecystitis and possible cholelithiasis.  Options discussed with the patient.  We discussed observation versus  further testing with HIDA scan versus proceeding with cholecystectomy.  Clinically and historically her symptoms are certainly consistent with that of typical gallbladder disease.  I have recommended laparoscopic cholecystectomy.  The risks including bleeding, infection, injury to intra-abdominal structures including the bile duct, stomach, duodenum, colon, small intestine and vascular structures and liver were discussed and the patient agrees to proceed.      Wilmer Manrique MD, FACS  General, Minimally Invasive and Endoscopic Surgery  [facility]    [address], [address]  [place], [place], [address]  P: [phone]  F: [phone]

## 2019-02-01 ENCOUNTER — APPOINTMENT (OUTPATIENT)
Dept: PREADMISSION TESTING | Facility: HOSPITAL | Age: 39
End: 2019-02-01

## 2019-02-01 VITALS
HEIGHT: 63 IN | BODY MASS INDEX: 20.55 KG/M2 | SYSTOLIC BLOOD PRESSURE: 108 MMHG | OXYGEN SATURATION: 100 % | RESPIRATION RATE: 16 BRPM | TEMPERATURE: 97.9 F | HEART RATE: 69 BPM | WEIGHT: 116 LBS | DIASTOLIC BLOOD PRESSURE: 63 MMHG

## 2019-02-01 LAB
ALBUMIN SERPL-MCNC: 4.5 G/DL (ref 3.5–5.2)
ALBUMIN/GLOB SERPL: 2 G/DL
ALP SERPL-CCNC: 60 U/L (ref 39–117)
ALT SERPL W P-5'-P-CCNC: 12 U/L (ref 1–33)
ANION GAP SERPL CALCULATED.3IONS-SCNC: 12.5 MMOL/L
AST SERPL-CCNC: 14 U/L (ref 1–32)
BASOPHILS # BLD AUTO: 0.03 10*3/MM3 (ref 0–0.2)
BASOPHILS NFR BLD AUTO: 0.6 % (ref 0–1.5)
BILIRUB SERPL-MCNC: 0.4 MG/DL (ref 0.1–1.2)
BUN BLD-MCNC: 11 MG/DL (ref 6–20)
BUN/CREAT SERPL: 13.3 (ref 7–25)
CALCIUM SPEC-SCNC: 9.3 MG/DL (ref 8.6–10.5)
CHLORIDE SERPL-SCNC: 101 MMOL/L (ref 98–107)
CO2 SERPL-SCNC: 26.5 MMOL/L (ref 22–29)
CREAT BLD-MCNC: 0.83 MG/DL (ref 0.57–1)
DEPRECATED RDW RBC AUTO: 43.7 FL (ref 37–54)
EOSINOPHIL # BLD AUTO: 0.12 10*3/MM3 (ref 0–0.7)
EOSINOPHIL NFR BLD AUTO: 2.5 % (ref 0.3–6.2)
ERYTHROCYTE [DISTWIDTH] IN BLOOD BY AUTOMATED COUNT: 13.4 % (ref 11.7–13)
GFR SERPL CREATININE-BSD FRML MDRD: 78 ML/MIN/1.73
GLOBULIN UR ELPH-MCNC: 2.3 GM/DL
GLUCOSE BLD-MCNC: 92 MG/DL (ref 65–99)
HCT VFR BLD AUTO: 38.7 % (ref 35.6–45.5)
HGB BLD-MCNC: 12.6 G/DL (ref 11.9–15.5)
IMM GRANULOCYTES # BLD AUTO: 0.01 10*3/MM3 (ref 0–0.03)
IMM GRANULOCYTES NFR BLD AUTO: 0.2 % (ref 0–0.5)
LYMPHOCYTES # BLD AUTO: 1.68 10*3/MM3 (ref 0.9–4.8)
LYMPHOCYTES NFR BLD AUTO: 34.9 % (ref 19.6–45.3)
MCH RBC QN AUTO: 29.2 PG (ref 26.9–32)
MCHC RBC AUTO-ENTMCNC: 32.6 G/DL (ref 32.4–36.3)
MCV RBC AUTO: 89.6 FL (ref 80.5–98.2)
MONOCYTES # BLD AUTO: 0.48 10*3/MM3 (ref 0.2–1.2)
MONOCYTES NFR BLD AUTO: 10 % (ref 5–12)
NEUTROPHILS # BLD AUTO: 2.5 10*3/MM3 (ref 1.9–8.1)
NEUTROPHILS NFR BLD AUTO: 52 % (ref 42.7–76)
PLATELET # BLD AUTO: 223 10*3/MM3 (ref 140–500)
PMV BLD AUTO: 9.8 FL (ref 6–12)
POTASSIUM BLD-SCNC: 4.2 MMOL/L (ref 3.5–5.2)
PROT SERPL-MCNC: 6.8 G/DL (ref 6–8.5)
RBC # BLD AUTO: 4.32 10*6/MM3 (ref 3.9–5.2)
SODIUM BLD-SCNC: 140 MMOL/L (ref 136–145)
WBC NRBC COR # BLD: 4.81 10*3/MM3 (ref 4.5–10.7)

## 2019-02-01 PROCEDURE — 80053 COMPREHEN METABOLIC PANEL: CPT | Performed by: SURGERY

## 2019-02-01 PROCEDURE — 36415 COLL VENOUS BLD VENIPUNCTURE: CPT | Performed by: SURGERY

## 2019-02-01 PROCEDURE — 85025 COMPLETE CBC W/AUTO DIFF WBC: CPT | Performed by: SURGERY

## 2019-02-01 NOTE — DISCHARGE INSTRUCTIONS
Take the following medications the morning of surgery with a small sip of water:  NONE      General Instructions:  • Do not eat solid food after midnight the night before surgery.  • You may drink clear liquids day of surgery but must stop at least one hour before your hospital arrival time.  • It is beneficial for you to have a clear drink that contains carbohydrates the day of surgery.  We suggest a 12 to 20 ounce bottle of Gatorade or Powerade for non-diabetic patients or a 12 to 20 ounce bottle of G2 or Powerade Zero for diabetic patients. (Pediatric patients, are not advised to drink a 12 to 20 ounce carbohydrate drink)    Clear liquids are liquids you can see through.  Nothing red in color.     Plain water                               Sports drinks  Sodas                                   Gelatin (Jell-O)  Fruit juices without pulp such as white grape juice and apple juice  Popsicles that contain no fruit or yogurt  Tea or coffee (no cream or milk added)  Gatorade / Powerade  G2 / Powerade Zero    • Infants may have breast milk up to four hours before surgery.  • Infants drinking formula may drink formula up to six hours before surgery.   • Patients who avoid smoking, chewing tobacco and alcohol for 4 weeks prior to surgery have a reduced risk of post-operative complications.  Quit smoking as many days before surgery as you can.  • Do not smoke, use chewing tobacco or drink alcohol the day of surgery.   • If applicable bring your C-PAP/ BI-PAP machine.  • Bring any papers given to you in the doctor’s office.  • Wear clean comfortable clothes and socks.  • Do not wear contact lenses or make-up.  Bring a case for your glasses.   • Bring crutches or walker if applicable.  • Remove all piercings.  Leave jewelry and any other valuables at home.  • Hair extensions with metal clips must be removed prior to surgery.  • The Pre-Admission Testing nurse will instruct you to bring medications if unable to obtain an  accurate list in Pre-Admission Testing.        If you were given a blood bank ID arm band remember to bring it with you the day of surgery.    Preventing a Surgical Site Infection:  • For 2 to 3 days before surgery, avoid shaving with a razor because the razor can irritate skin and make it easier to develop an infection.    • Any areas of open skin can increase the risk of a post-operative wound infection by allowing bacteria to enter and travel throughout the body.  Notify your surgeon if you have any skin wounds / rashes even if it is not near the expected surgical site.  The area will need assessed to determine if surgery should be delayed until it is healed.  • The night prior to surgery sleep in a clean bed with clean clothing.  Do not allow pets to sleep with you.  • Shower on the morning of surgery using a fresh bar of anti-bacterial soap (such as Dial) and clean washcloth.  Dry with a clean towel and dress in clean clothing.  • Ask your surgeon if you will be receiving antibiotics prior to surgery.  • Make sure you, your family, and all healthcare providers clean their hands with soap and water or an alcohol based hand  before caring for you or your wound.    Day of surgery: 2/8/2019 ARRIVAL TIME  7:30 AM  Upon arrival, a Pre-op nurse and Anesthesiologist will review your health history, obtain vital signs, and answer questions you may have.  The only belongings needed at this time will be your home medications and if applicable your C-PAP/BI-PAP machine.  If you are staying overnight your family can leave the rest of your belongings in the car and bring them to your room later.  A Pre-op nurse will start an IV and you may receive medication in preparation for surgery, including something to help you relax.  Your family will be able to see you in the Pre-op area.  While you are in surgery your family should notify the waiting room  if they leave the waiting room area and provide a  contact phone number.    Please be aware that surgery does come with discomfort.  We want to make every effort to control your discomfort so please discuss any uncontrolled symptoms with your nurse.   Your doctor will most likely have prescribed pain medications.      If you are going home after surgery you will receive individualized written care instructions before being discharged.  A responsible adult must drive you to and from the hospital on the day of your surgery and stay with you for 24 hours.    If you are staying overnight following surgery, you will be transported to your hospital room following the recovery period.  [facility] has all private rooms.    You have received a list of surgical assistants for your reference.  If you have any questions please call Pre-Admission Testing at [phone].  Deductibles and co-payments are collected on the day of service. Please be prepared to pay the required co-pay, deductible or deposit on the day of service as defined by your plan.

## 2019-02-08 ENCOUNTER — APPOINTMENT (OUTPATIENT)
Dept: GENERAL RADIOLOGY | Facility: HOSPITAL | Age: 39
End: 2019-02-08

## 2019-02-08 ENCOUNTER — ANESTHESIA EVENT (OUTPATIENT)
Dept: PERIOP | Facility: HOSPITAL | Age: 39
End: 2019-02-08

## 2019-02-08 ENCOUNTER — HOSPITAL ENCOUNTER (OUTPATIENT)
Facility: HOSPITAL | Age: 39
Setting detail: HOSPITAL OUTPATIENT SURGERY
Discharge: HOME | End: 2019-02-08
Attending: SURGERY | Admitting: SURGERY

## 2019-02-08 ENCOUNTER — ANESTHESIA (OUTPATIENT)
Dept: PERIOP | Facility: HOSPITAL | Age: 39
End: 2019-02-08

## 2019-02-08 VITALS
HEART RATE: 70 BPM | WEIGHT: 117.31 LBS | SYSTOLIC BLOOD PRESSURE: 93 MMHG | TEMPERATURE: 97.8 F | DIASTOLIC BLOOD PRESSURE: 59 MMHG | OXYGEN SATURATION: 100 % | HEIGHT: 63 IN | RESPIRATION RATE: 16 BRPM | BODY MASS INDEX: 20.79 KG/M2

## 2019-02-08 DIAGNOSIS — K80.20 CALCULUS OF GALLBLADDER WITHOUT CHOLECYSTITIS WITHOUT OBSTRUCTION: ICD-10-CM

## 2019-02-08 LAB
B-HCG UR QL: NEGATIVE
INTERNAL NEGATIVE CONTROL: NEGATIVE
INTERNAL POSITIVE CONTROL: POSITIVE
Lab: NORMAL

## 2019-02-08 PROCEDURE — 88304 TISSUE EXAM BY PATHOLOGIST: CPT | Performed by: SURGERY

## 2019-02-08 PROCEDURE — 25010000002 NEOSTIGMINE PER 0.5 MG: Performed by: ANESTHESIOLOGY

## 2019-02-08 PROCEDURE — 74300 X-RAY BILE DUCTS/PANCREAS: CPT

## 2019-02-08 PROCEDURE — 25010000002 ONDANSETRON PER 1 MG: Performed by: ANESTHESIOLOGY

## 2019-02-08 PROCEDURE — 25010000002 PROMETHAZINE PER 50 MG: Performed by: ANESTHESIOLOGY

## 2019-02-08 PROCEDURE — 0 IOTHALAMATE 60 % SOLUTION: Performed by: SURGERY

## 2019-02-08 PROCEDURE — 25010000002 FENTANYL CITRATE (PF) 100 MCG/2ML SOLUTION: Performed by: ANESTHESIOLOGY

## 2019-02-08 PROCEDURE — 25010000002 HYDROMORPHONE PER 4 MG: Performed by: ANESTHESIOLOGY

## 2019-02-08 PROCEDURE — 25010000002 MIDAZOLAM PER 1 MG: Performed by: ANESTHESIOLOGY

## 2019-02-08 PROCEDURE — 25010000003 CEFAZOLIN IN DEXTROSE 2-4 GM/100ML-% SOLUTION: Performed by: SURGERY

## 2019-02-08 PROCEDURE — 25010000002 KETOROLAC TROMETHAMINE PER 15 MG: Performed by: ANESTHESIOLOGY

## 2019-02-08 PROCEDURE — 25010000002 DEXAMETHASONE PER 1 MG: Performed by: ANESTHESIOLOGY

## 2019-02-08 PROCEDURE — 81025 URINE PREGNANCY TEST: CPT | Performed by: ANESTHESIOLOGY

## 2019-02-08 PROCEDURE — 25010000002 PROPOFOL 10 MG/ML EMULSION: Performed by: ANESTHESIOLOGY

## 2019-02-08 PROCEDURE — 47563 LAPARO CHOLECYSTECTOMY/GRAPH: CPT | Performed by: SURGERY

## 2019-02-08 RX ORDER — PROMETHAZINE HYDROCHLORIDE 25 MG/ML
6.25 INJECTION, SOLUTION INTRAMUSCULAR; INTRAVENOUS ONCE AS NEEDED
Status: COMPLETED | OUTPATIENT
Start: 2019-02-08 | End: 2019-02-08

## 2019-02-08 RX ORDER — PROMETHAZINE HYDROCHLORIDE 25 MG/1
25 TABLET ORAL ONCE AS NEEDED
Status: COMPLETED | OUTPATIENT
Start: 2019-02-08 | End: 2019-02-08

## 2019-02-08 RX ORDER — SCOLOPAMINE TRANSDERMAL SYSTEM 1 MG/1
1 PATCH, EXTENDED RELEASE TRANSDERMAL
Status: DISCONTINUED | OUTPATIENT
Start: 2019-02-08 | End: 2019-02-08 | Stop reason: HOSPADM

## 2019-02-08 RX ORDER — ROCURONIUM BROMIDE 10 MG/ML
INJECTION, SOLUTION INTRAVENOUS AS NEEDED
Status: DISCONTINUED | OUTPATIENT
Start: 2019-02-08 | End: 2019-02-08 | Stop reason: SURG

## 2019-02-08 RX ORDER — GLYCOPYRROLATE 0.2 MG/ML
INJECTION INTRAMUSCULAR; INTRAVENOUS AS NEEDED
Status: DISCONTINUED | OUTPATIENT
Start: 2019-02-08 | End: 2019-02-08 | Stop reason: SURG

## 2019-02-08 RX ORDER — HYDROCODONE BITARTRATE AND ACETAMINOPHEN 7.5; 325 MG/1; MG/1
1 TABLET ORAL EVERY 6 HOURS PRN
Qty: 30 TABLET | Refills: 0 | Status: SHIPPED | OUTPATIENT
Start: 2019-02-08 | End: 2019-02-18

## 2019-02-08 RX ORDER — FAMOTIDINE 10 MG/ML
20 INJECTION, SOLUTION INTRAVENOUS ONCE
Status: COMPLETED | OUTPATIENT
Start: 2019-02-08 | End: 2019-02-08

## 2019-02-08 RX ORDER — FENTANYL CITRATE 50 UG/ML
50 INJECTION, SOLUTION INTRAMUSCULAR; INTRAVENOUS
Status: DISCONTINUED | OUTPATIENT
Start: 2019-02-08 | End: 2019-02-08 | Stop reason: HOSPADM

## 2019-02-08 RX ORDER — DEXAMETHASONE SODIUM PHOSPHATE 10 MG/ML
INJECTION INTRAMUSCULAR; INTRAVENOUS AS NEEDED
Status: DISCONTINUED | OUTPATIENT
Start: 2019-02-08 | End: 2019-02-08 | Stop reason: SURG

## 2019-02-08 RX ORDER — ONDANSETRON 2 MG/ML
4 INJECTION INTRAMUSCULAR; INTRAVENOUS ONCE AS NEEDED
Status: COMPLETED | OUTPATIENT
Start: 2019-02-08 | End: 2019-02-08

## 2019-02-08 RX ORDER — SODIUM CHLORIDE 0.9 % (FLUSH) 0.9 %
1-10 SYRINGE (ML) INJECTION AS NEEDED
Status: DISCONTINUED | OUTPATIENT
Start: 2019-02-08 | End: 2019-02-08 | Stop reason: HOSPADM

## 2019-02-08 RX ORDER — LIDOCAINE HYDROCHLORIDE 10 MG/ML
0.5 INJECTION, SOLUTION EPIDURAL; INFILTRATION; INTRACAUDAL; PERINEURAL ONCE AS NEEDED
Status: DISCONTINUED | OUTPATIENT
Start: 2019-02-08 | End: 2019-02-08 | Stop reason: HOSPADM

## 2019-02-08 RX ORDER — LIDOCAINE HYDROCHLORIDE 20 MG/ML
INJECTION, SOLUTION INFILTRATION; PERINEURAL AS NEEDED
Status: DISCONTINUED | OUTPATIENT
Start: 2019-02-08 | End: 2019-02-08 | Stop reason: SURG

## 2019-02-08 RX ORDER — OXYCODONE AND ACETAMINOPHEN 7.5; 325 MG/1; MG/1
1 TABLET ORAL EVERY 4 HOURS PRN
Status: DISCONTINUED | OUTPATIENT
Start: 2019-02-08 | End: 2019-02-08 | Stop reason: HOSPADM

## 2019-02-08 RX ORDER — MIDAZOLAM HYDROCHLORIDE 1 MG/ML
1 INJECTION INTRAMUSCULAR; INTRAVENOUS
Status: DISCONTINUED | OUTPATIENT
Start: 2019-02-08 | End: 2019-02-08 | Stop reason: HOSPADM

## 2019-02-08 RX ORDER — MIDAZOLAM HYDROCHLORIDE 1 MG/ML
2 INJECTION INTRAMUSCULAR; INTRAVENOUS
Status: DISCONTINUED | OUTPATIENT
Start: 2019-02-08 | End: 2019-02-08 | Stop reason: HOSPADM

## 2019-02-08 RX ORDER — FENTANYL CITRATE 50 UG/ML
INJECTION, SOLUTION INTRAMUSCULAR; INTRAVENOUS AS NEEDED
Status: DISCONTINUED | OUTPATIENT
Start: 2019-02-08 | End: 2019-02-08 | Stop reason: SURG

## 2019-02-08 RX ORDER — PROMETHAZINE HYDROCHLORIDE 25 MG/1
25 SUPPOSITORY RECTAL ONCE AS NEEDED
Status: COMPLETED | OUTPATIENT
Start: 2019-02-08 | End: 2019-02-08

## 2019-02-08 RX ORDER — PROPOFOL 10 MG/ML
VIAL (ML) INTRAVENOUS AS NEEDED
Status: DISCONTINUED | OUTPATIENT
Start: 2019-02-08 | End: 2019-02-08 | Stop reason: SURG

## 2019-02-08 RX ORDER — MAGNESIUM HYDROXIDE 1200 MG/15ML
LIQUID ORAL AS NEEDED
Status: DISCONTINUED | OUTPATIENT
Start: 2019-02-08 | End: 2019-02-08 | Stop reason: HOSPADM

## 2019-02-08 RX ORDER — EPHEDRINE SULFATE 50 MG/ML
5 INJECTION, SOLUTION INTRAVENOUS ONCE AS NEEDED
Status: DISCONTINUED | OUTPATIENT
Start: 2019-02-08 | End: 2019-02-08 | Stop reason: HOSPADM

## 2019-02-08 RX ORDER — ONDANSETRON 2 MG/ML
INJECTION INTRAMUSCULAR; INTRAVENOUS AS NEEDED
Status: DISCONTINUED | OUTPATIENT
Start: 2019-02-08 | End: 2019-02-08 | Stop reason: SURG

## 2019-02-08 RX ORDER — BUPIVACAINE HYDROCHLORIDE 2.5 MG/ML
INJECTION, SOLUTION EPIDURAL; INFILTRATION; INTRACAUDAL AS NEEDED
Status: DISCONTINUED | OUTPATIENT
Start: 2019-02-08 | End: 2019-02-08 | Stop reason: HOSPADM

## 2019-02-08 RX ORDER — KETOROLAC TROMETHAMINE 30 MG/ML
INJECTION, SOLUTION INTRAMUSCULAR; INTRAVENOUS AS NEEDED
Status: DISCONTINUED | OUTPATIENT
Start: 2019-02-08 | End: 2019-02-08 | Stop reason: SURG

## 2019-02-08 RX ORDER — HYDROCODONE BITARTRATE AND ACETAMINOPHEN 7.5; 325 MG/1; MG/1
1 TABLET ORAL ONCE AS NEEDED
Status: COMPLETED | OUTPATIENT
Start: 2019-02-08 | End: 2019-02-08

## 2019-02-08 RX ORDER — SODIUM CHLORIDE 9 MG/ML
INJECTION, SOLUTION INTRAVENOUS AS NEEDED
Status: DISCONTINUED | OUTPATIENT
Start: 2019-02-08 | End: 2019-02-08 | Stop reason: HOSPADM

## 2019-02-08 RX ORDER — CEFAZOLIN SODIUM 2 G/100ML
2 INJECTION, SOLUTION INTRAVENOUS ONCE
Status: COMPLETED | OUTPATIENT
Start: 2019-02-08 | End: 2019-02-08

## 2019-02-08 RX ORDER — SODIUM CHLORIDE, SODIUM LACTATE, POTASSIUM CHLORIDE, CALCIUM CHLORIDE 600; 310; 30; 20 MG/100ML; MG/100ML; MG/100ML; MG/100ML
9 INJECTION, SOLUTION INTRAVENOUS CONTINUOUS
Status: DISCONTINUED | OUTPATIENT
Start: 2019-02-08 | End: 2019-02-08 | Stop reason: HOSPADM

## 2019-02-08 RX ORDER — HYDROMORPHONE HYDROCHLORIDE 1 MG/ML
0.5 INJECTION, SOLUTION INTRAMUSCULAR; INTRAVENOUS; SUBCUTANEOUS
Status: DISCONTINUED | OUTPATIENT
Start: 2019-02-08 | End: 2019-02-08 | Stop reason: HOSPADM

## 2019-02-08 RX ORDER — FLUMAZENIL 0.1 MG/ML
0.2 INJECTION INTRAVENOUS AS NEEDED
Status: DISCONTINUED | OUTPATIENT
Start: 2019-02-08 | End: 2019-02-08 | Stop reason: HOSPADM

## 2019-02-08 RX ADMIN — DEXAMETHASONE SODIUM PHOSPHATE 8 MG: 10 INJECTION INTRAMUSCULAR; INTRAVENOUS at 09:49

## 2019-02-08 RX ADMIN — FENTANYL CITRATE 100 MCG: 50 INJECTION, SOLUTION INTRAMUSCULAR; INTRAVENOUS at 09:40

## 2019-02-08 RX ADMIN — GLYCOPYRROLATE 0.6 MG: 0.2 INJECTION INTRAMUSCULAR; INTRAVENOUS at 10:30

## 2019-02-08 RX ADMIN — FENTANYL CITRATE 50 MCG: 50 INJECTION, SOLUTION INTRAMUSCULAR; INTRAVENOUS at 10:49

## 2019-02-08 RX ADMIN — SODIUM CHLORIDE, POTASSIUM CHLORIDE, SODIUM LACTATE AND CALCIUM CHLORIDE: 600; 310; 30; 20 INJECTION, SOLUTION INTRAVENOUS at 09:35

## 2019-02-08 RX ADMIN — ONDANSETRON 4 MG: 2 INJECTION INTRAMUSCULAR; INTRAVENOUS at 09:49

## 2019-02-08 RX ADMIN — PROPOFOL 180 MG: 10 INJECTION, EMULSION INTRAVENOUS at 09:41

## 2019-02-08 RX ADMIN — ONDANSETRON 4 MG: 2 INJECTION INTRAMUSCULAR; INTRAVENOUS at 10:51

## 2019-02-08 RX ADMIN — KETOROLAC TROMETHAMINE 30 MG: 30 INJECTION, SOLUTION INTRAMUSCULAR; INTRAVENOUS at 09:49

## 2019-02-08 RX ADMIN — NEOSTIGMINE METHYLSULFATE 4 MG: 1 INJECTION INTRAMUSCULAR; INTRAVENOUS; SUBCUTANEOUS at 10:31

## 2019-02-08 RX ADMIN — HYDROMORPHONE HYDROCHLORIDE 0.5 MG: 1 INJECTION, SOLUTION INTRAMUSCULAR; INTRAVENOUS; SUBCUTANEOUS at 11:18

## 2019-02-08 RX ADMIN — FENTANYL CITRATE 50 MCG: 50 INJECTION, SOLUTION INTRAMUSCULAR; INTRAVENOUS at 10:25

## 2019-02-08 RX ADMIN — HYDROCODONE BITARTRATE AND ACETAMINOPHEN 1 TABLET: 7.5; 325 TABLET ORAL at 11:30

## 2019-02-08 RX ADMIN — PROMETHAZINE HYDROCHLORIDE 6.25 MG: 25 INJECTION INTRAMUSCULAR; INTRAVENOUS at 10:56

## 2019-02-08 RX ADMIN — SCOPALAMINE 1 PATCH: 1 PATCH, EXTENDED RELEASE TRANSDERMAL at 09:11

## 2019-02-08 RX ADMIN — ROCURONIUM BROMIDE 25 MG: 10 INJECTION INTRAVENOUS at 09:43

## 2019-02-08 RX ADMIN — FENTANYL CITRATE 50 MCG: 50 INJECTION, SOLUTION INTRAMUSCULAR; INTRAVENOUS at 10:31

## 2019-02-08 RX ADMIN — CEFAZOLIN SODIUM 2 G: 2 INJECTION, SOLUTION INTRAVENOUS at 09:38

## 2019-02-08 RX ADMIN — SODIUM CHLORIDE, POTASSIUM CHLORIDE, SODIUM LACTATE AND CALCIUM CHLORIDE 9 ML/HR: 600; 310; 30; 20 INJECTION, SOLUTION INTRAVENOUS at 09:11

## 2019-02-08 RX ADMIN — MIDAZOLAM 2 MG: 1 INJECTION INTRAMUSCULAR; INTRAVENOUS at 09:11

## 2019-02-08 RX ADMIN — FENTANYL CITRATE 50 MCG: 50 INJECTION, SOLUTION INTRAMUSCULAR; INTRAVENOUS at 10:15

## 2019-02-08 RX ADMIN — LIDOCAINE HYDROCHLORIDE 60 MG: 20 INJECTION, SOLUTION INFILTRATION; PERINEURAL at 09:40

## 2019-02-08 RX ADMIN — FAMOTIDINE 20 MG: 10 INJECTION, SOLUTION INTRAVENOUS at 09:11

## 2019-02-08 NOTE — ANESTHESIA PROCEDURE NOTES
Airway  Urgency: elective    Date/Time: 2/8/2019 9:45 AM  Airway not difficult    General Information and Staff    Patient location during procedure: OR  Anesthesiologist: Aman Hallman MD    Indications and Patient Condition  Indications for airway management: airway protection    Preoxygenated: yes  Mask difficulty assessment: 1 - vent by mask    Final Airway Details  Final airway type: endotracheal airway      Successful airway: ETT  Cuffed: yes   Successful intubation technique: direct laryngoscopy  Endotracheal tube insertion site: oral  Blade: Vo  Blade size: 2  ETT size (mm): 7.0  Cormack-Lehane Classification: grade I - full view of glottis  Placement verified by: chest auscultation and capnometry   Measured from: teeth  ETT to teeth (cm): 19  Number of attempts at approach: 1    Additional Comments  Pre oxygenated  Easy mask vent  Atraumatic intubation  + etco2  Breath sounds equal bilaterally

## 2019-02-08 NOTE — OP NOTE
Operative Note :   Wilmer Manrique MD    Tere Fernandezne  1980    Procedure Date: 02/08/19    Pre-op Diagnosis:  cholecystitis    Post-Op Diagnosis:  Same    Procedure:   · Laparoscopic cholecystectomy with cholangiogram    Surgeon: Wilmer Marnique MD    Assistant: Redd Parker    Anesthesia:  General (general endotracheal tube)    EBL:   minimal    Specimens:   Gallbladder and contents    Indications:  · 38-year-old lady with right upper quadrant pain and sludge in a pattern consistent with that of cholecystitis.    Findings:   · None    Recommendations:   · Routine postoperative care    Description of procedure:    After obtaining informed consent, patient was brought to the operating room and placed under a general anesthetic.  Patient's abdomen was sterilely prepped and draped.  Supraumbilical skin incision was made and carried through the skin as a cutaneous tissues onto the fascia.  Fascia was incised and lifted up.  #1 Vicryl suture was then placed on each side of the fashion a U stitch pattern.  Peritoneum was then penetrated bluntly and the Alberto trocar was inserted and the abdomen was insufflated with CO2.  Cursory examination of the abdomen was unremarkable.  Additional 5 mm trochars were introduced, one in the epigastrium and 2 in the right upper quadrant.  Patient was in position with her head up and right side up.  The fundus of the gallbladder was grasped and retracted cephalad.  Some adhesions between the omentum and gallbladder were taken down sharply.  There were some adhesions between the distal stomach and the pylorus and the infundibulum of the gallbladder which were taken down with a combination of blunt and sharp dissection.  The infundibulum was then identified lately and grasped and retracted laterally.  The peritoneum overlying the cystic triangle was incised and peeled down.  The cystic duct was identified and dissected completely.  The cystic artery was identified further back in the  triangle and isolated.  The remainder of the triangle was cleared out and then the lower half of the gallbladder was mobilized off the bed of the liver.  The cystic artery was then clipped twice proximally and once distally.  A clip was placed on the gallbladder side of the gallbladder cystic duct junction.  An incision was made in the cystic duct and the cholangiocatheter was introduced through the abdominal wall and clipped into place in the cystic duct.  We then shot our cholangiogram and we had good immediate filling of the duodenum.  The distal bile duct had a smooth contour.  The proximal biliary radicals filled nicely.  There was what look like an aberrant duct filling off the cystic duct distally, but well away from where we were working.  The cholangiocatheter was then removed and 2 clips are placed on the common bile duct side of the cystic duct and the cystic duct was then divided between these clips.  The cystic artery was divided between the previously placed clips and the gallbladder was then removed from the bed of the liver with electrocautery and then placed in an Endo Catch bag and removed.  The liver was then lifted up and inspected and no bleeding was seen.  The clips on the cystic duct and cystic artery stumps were in good position.  The trochars were removed.  The abdomen was desufflated.  Supraumbilical fascial incision was closed with #1 Vicryl suture.  Skin incisions were closed with 4-0 Monocryl.  Sterile dressings were applied.  Patient tolerated this well.    Wilmer Manrique MD  General and Endoscopic Surgery  [facility]    [address], [address]  [place], [place], [address]  P: [phone]  F: [phone]

## 2019-02-08 NOTE — ANESTHESIA POSTPROCEDURE EVALUATION
Patient: Tere Lugo    Procedure Summary     Date:  02/08/19 Room / Location:  [facility] OR  / [facility] MAIN OR    Anesthesia Start:  0938 Anesthesia Stop:  1048    Procedure:  CHOLECYSTECTOMY LAPAROSCOPIC INTRAOPERATIVE CHOLANGIOGRAM (N/A Abdomen) Diagnosis:       Calculus of gallbladder without cholecystitis without obstruction      (Calculus of gallbladder without cholecystitis without obstruction [K80.20])    Surgeon:  Wilmer Manrique MD Provider:  Aman Hallman MD    Anesthesia Type:  general ASA Status:  1          Anesthesia Type: general  Last vitals  BP   100/52 (02/08/19 0802)   Temp   36.6 °C (97.9 °F) (02/08/19 0802)   Pulse   70 (02/08/19 0915)   Resp   16 (02/08/19 0802)     SpO2   100 % (02/08/19 0915)     Post Anesthesia Care and Evaluation    Patient location during evaluation: PACU  Patient participation: complete - patient participated  Level of consciousness: awake and alert  Pain management: adequate  Airway patency: patent  Anesthetic complications: No anesthetic complications    Cardiovascular status: acceptable  Respiratory status: acceptable  Hydration status: acceptable    Comments: --------------------            02/08/19 0915     --------------------   BP:                  Pulse:      70       Resp:                Temp:                SpO2:      100%     --------------------

## 2019-02-08 NOTE — ANESTHESIA PREPROCEDURE EVALUATION
Anesthesia Evaluation     Patient summary reviewed and Nursing notes reviewed                Airway   Mallampati: II  Small opening  Dental      Pulmonary - negative pulmonary ROS   Cardiovascular - negative cardio ROS    Rhythm: regular  Rate: normal        Neuro/Psych- negative ROS  GI/Hepatic/Renal/Endo - negative ROS     Musculoskeletal (-) negative ROS    Abdominal    Substance History - negative use     OB/GYN negative ob/gyn ROS         Other                        Anesthesia Plan    ASA 1     general   (Breastfeeding but will pump and dump for 24 hours)  intravenous induction   Anesthetic plan, all risks, benefits, and alternatives have been provided, discussed and informed consent has been obtained with: patient.

## 2019-02-11 LAB
CYTO UR: NORMAL
LAB AP CASE REPORT: NORMAL
PATH REPORT.FINAL DX SPEC: NORMAL
PATH REPORT.GROSS SPEC: NORMAL

## 2019-02-18 ENCOUNTER — OFFICE VISIT (OUTPATIENT)
Dept: SURGERY | Facility: CLINIC | Age: 39
End: 2019-02-18

## 2019-02-18 DIAGNOSIS — K81.9 CHOLECYSTITIS: Primary | ICD-10-CM

## 2019-02-18 PROCEDURE — 99024 POSTOP FOLLOW-UP VISIT: CPT | Performed by: SURGERY

## 2019-02-18 NOTE — PROGRESS NOTES
Follow-up cholecystectomy    Subjective:  No complaints.  Pain is essentially resolved and preoperative symptoms are resolved.  Eating okay.  I'll function adequate.    Objective:  Abdomen is soft and benign, incisions are well-healed.    Assessment and plan:  -Status post cholecystectomy for cholecystitis  -Pathology consistent with chronic cholecystitis  -Patient doing well, returned to work already.  -Follow-up as needed.    Wilmer Manrique MD  General and Endoscopic Surgery  [facility]    [address], [address]  [place], [place], [address]  P: [phone]  F: [phone]

## 2021-04-16 ENCOUNTER — BULK ORDERING (OUTPATIENT)
Dept: CASE MANAGEMENT | Facility: OTHER | Age: 41
End: 2021-04-16

## 2021-04-16 DIAGNOSIS — Z23 IMMUNIZATION DUE: ICD-10-CM

## (undated) DEVICE — SUT MNCRYL PLS ANTIB UD 4/0 PS2 18IN

## (undated) DEVICE — LOU LAP CHOLE: Brand: MEDLINE INDUSTRIES, INC.

## (undated) DEVICE — SOL IRR NACL 0.9PCT BT 1000ML

## (undated) DEVICE — CONTAINER,SPECIMEN,OR STERILE,4OZ: Brand: MEDLINE

## (undated) DEVICE — ENDOPOUCH RETRIEVER SPECIMEN RETRIEVAL BAGS: Brand: ENDOPOUCH RETRIEVER

## (undated) DEVICE — GLV SURG BIOGEL LTX PF 8 1/2

## (undated) DEVICE — BNDG ADHS FABRC 1X3IN

## (undated) DEVICE — KENDALL SCD EXPRESS SLEEVES, KNEE LENGTH, MEDIUM: Brand: KENDALL SCD

## (undated) DEVICE — PENCL E/S HNDSWCH ROCKR CB

## (undated) DEVICE — 3M™ STERI-STRIP™ COMPOUND BENZOIN TINCTURE 40 BAGS/CARTON 4 CARTONS/CASE C1544: Brand: 3M™ STERI-STRIP™

## (undated) DEVICE — APPL CHLORAPREP W/TINT 26ML ORNG

## (undated) DEVICE — ENDOPATH XCEL UNIVERSAL TROCAR STABLILITY SLEEVES: Brand: ENDOPATH XCEL

## (undated) DEVICE — GLV SURG BIOGEL LTX PF 7 1/2

## (undated) DEVICE — SUT MNCRYL 4/0 PS2 18 IN

## (undated) DEVICE — SOL NACL 0.9PCT 1000ML

## (undated) DEVICE — NDL BLNT 18G 1 1/2IN

## (undated) DEVICE — VISUALIZATION SYSTEM: Brand: CLEARIFY

## (undated) DEVICE — 3M(TM) TEGADERM(TM) TRANSPARENT FILM DRESSING FRAME STYLE 1627: Brand: 3M™ TEGADERM™

## (undated) DEVICE — ENDOPATH XCEL BLADELESS TROCARS WITH STABILITY SLEEVES: Brand: ENDOPATH XCEL

## (undated) DEVICE — 3M™ STERI-STRIP™ REINFORCED ADHESIVE SKIN CLOSURES, R1547, 1/2 IN X 4 IN (12 MM X 100 MM), 6 STRIPS/ENVELOPE: Brand: 3M™ STERI-STRIP™

## (undated) DEVICE — SUT VIC 3/0 CTI 36IN J944H

## (undated) DEVICE — GLV SURG BIOGEL LTX PF 7

## (undated) DEVICE — CATH CHOLANG 4.5F18IN BRGNDY

## (undated) DEVICE — ENDOPATH XCEL BLUNT TIP TROCARS WITH SMOOTH SLEEVES: Brand: ENDOPATH XCEL

## (undated) DEVICE — DRAPE,REIN 53X77,STERILE: Brand: MEDLINE

## (undated) DEVICE — ENDOCUT SCISSOR TIP, DISPOSABLE: Brand: RENEW

## (undated) DEVICE — STPCK 3WY D201 DISCOFIX

## (undated) DEVICE — EXTENSION SET, MALE LUER LOCK ADAPTER WITH RETRACTABLE COLLAR

## (undated) DEVICE — CATH IV INSYTE AUTOGARD 14G 1 1/2IN ORNG

## (undated) DEVICE — SUT MNCRYL 0/0 CTX 36IN Y398H

## (undated) DEVICE — STRIP SKIN CLOSEURE PROXI 1X5IN

## (undated) DEVICE — SUT PDS 0 CT1 36IN Z346H

## (undated) DEVICE — KT ART BLD GAS QUICK DRAW

## (undated) DEVICE — KTTNER ENDO BLNT DISSCT

## (undated) DEVICE — SUT VIC 0 CT1 36IN J946H

## (undated) DEVICE — DRP C/ARM 41X74IN

## (undated) DEVICE — VIOLET BRAIDED (POLYGLACTIN 910), SYNTHETIC ABSORBABLE SUTURE: Brand: COATED VICRYL